# Patient Record
Sex: MALE | Race: WHITE
[De-identification: names, ages, dates, MRNs, and addresses within clinical notes are randomized per-mention and may not be internally consistent; named-entity substitution may affect disease eponyms.]

---

## 2017-06-19 ENCOUNTER — HOSPITAL ENCOUNTER (OUTPATIENT)
Dept: HOSPITAL 83 - CT | Age: 57
Discharge: HOME | End: 2017-06-19
Attending: INTERNAL MEDICINE
Payer: COMMERCIAL

## 2017-06-19 DIAGNOSIS — R91.8: Primary | ICD-10-CM

## 2017-06-19 DIAGNOSIS — I25.10: ICD-10-CM

## 2017-08-14 ENCOUNTER — HOSPITAL ENCOUNTER (OUTPATIENT)
Dept: HOSPITAL 83 - RAD | Age: 57
Discharge: HOME | End: 2017-08-14
Attending: UROLOGY
Payer: COMMERCIAL

## 2017-08-14 DIAGNOSIS — I70.0: ICD-10-CM

## 2017-08-14 DIAGNOSIS — N20.0: Primary | ICD-10-CM

## 2017-12-03 PROBLEM — R07.9 CHEST PAIN: Status: ACTIVE | Noted: 2017-12-03

## 2017-12-20 PROBLEM — I25.10 CAD IN NATIVE ARTERY: Status: ACTIVE | Noted: 2017-12-20

## 2018-03-13 ENCOUNTER — OFFICE VISIT (OUTPATIENT)
Dept: CARDIOLOGY CLINIC | Age: 58
End: 2018-03-13
Payer: COMMERCIAL

## 2018-03-13 VITALS
DIASTOLIC BLOOD PRESSURE: 80 MMHG | HEIGHT: 67 IN | BODY MASS INDEX: 34.67 KG/M2 | SYSTOLIC BLOOD PRESSURE: 120 MMHG | WEIGHT: 220.9 LBS | HEART RATE: 50 BPM | RESPIRATION RATE: 16 BRPM

## 2018-03-13 DIAGNOSIS — I25.10 CAD IN NATIVE ARTERY: ICD-10-CM

## 2018-03-13 DIAGNOSIS — I25.10 CORONARY ARTERY DISEASE INVOLVING NATIVE CORONARY ARTERY OF NATIVE HEART WITHOUT ANGINA PECTORIS: Primary | ICD-10-CM

## 2018-03-13 PROBLEM — R07.9 CHEST PAIN: Status: RESOLVED | Noted: 2017-12-03 | Resolved: 2018-03-13

## 2018-03-13 PROCEDURE — 93000 ELECTROCARDIOGRAM COMPLETE: CPT | Performed by: INTERNAL MEDICINE

## 2018-03-13 PROCEDURE — 99213 OFFICE O/P EST LOW 20 MIN: CPT | Performed by: INTERNAL MEDICINE

## 2018-03-13 RX ORDER — ROSUVASTATIN CALCIUM 40 MG/1
40 TABLET, COATED ORAL NIGHTLY
COMMUNITY
Start: 2018-02-20

## 2018-03-13 RX ORDER — RANITIDINE 150 MG/1
CAPSULE ORAL
COMMUNITY
Start: 2018-02-22 | End: 2019-09-10

## 2018-06-26 ENCOUNTER — HOSPITAL ENCOUNTER (EMERGENCY)
Dept: HOSPITAL 83 - ED | Age: 58
Discharge: HOME | End: 2018-06-26
Payer: COMMERCIAL

## 2018-06-26 VITALS — HEIGHT: 66.97 IN | WEIGHT: 210 LBS | BODY MASS INDEX: 32.96 KG/M2

## 2018-06-26 DIAGNOSIS — Z79.899: ICD-10-CM

## 2018-06-26 DIAGNOSIS — S62.663B: Primary | ICD-10-CM
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
## 2018-06-26 DIAGNOSIS — S66.902A: ICD-10-CM
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
## 2018-06-26 DIAGNOSIS — Z79.82: ICD-10-CM

## 2019-03-01 ENCOUNTER — HOSPITAL ENCOUNTER (OUTPATIENT)
Dept: HOSPITAL 83 - RAD | Age: 59
Discharge: HOME | End: 2019-03-01
Attending: NURSE PRACTITIONER
Payer: COMMERCIAL

## 2019-03-01 DIAGNOSIS — K21.9: ICD-10-CM

## 2019-03-01 DIAGNOSIS — K57.10: Primary | ICD-10-CM

## 2019-03-08 ENCOUNTER — OFFICE VISIT (OUTPATIENT)
Dept: CARDIOLOGY CLINIC | Age: 59
End: 2019-03-08
Payer: COMMERCIAL

## 2019-03-08 VITALS
WEIGHT: 220.5 LBS | BODY MASS INDEX: 34.61 KG/M2 | SYSTOLIC BLOOD PRESSURE: 138 MMHG | HEART RATE: 47 BPM | DIASTOLIC BLOOD PRESSURE: 80 MMHG | HEIGHT: 67 IN | RESPIRATION RATE: 16 BRPM

## 2019-03-08 DIAGNOSIS — I25.10 CAD IN NATIVE ARTERY: Primary | ICD-10-CM

## 2019-03-08 PROCEDURE — 99213 OFFICE O/P EST LOW 20 MIN: CPT | Performed by: INTERNAL MEDICINE

## 2019-03-08 PROCEDURE — 93000 ELECTROCARDIOGRAM COMPLETE: CPT | Performed by: INTERNAL MEDICINE

## 2019-03-08 RX ORDER — FLUOXETINE HYDROCHLORIDE 40 MG/1
40 CAPSULE ORAL DAILY
COMMUNITY

## 2019-09-06 ENCOUNTER — TELEPHONE (OUTPATIENT)
Dept: ADMINISTRATIVE | Age: 59
End: 2019-09-06

## 2019-09-09 ENCOUNTER — TELEPHONE (OUTPATIENT)
Dept: CARDIOLOGY CLINIC | Age: 59
End: 2019-09-09

## 2019-09-09 ENCOUNTER — OFFICE VISIT (OUTPATIENT)
Dept: CARDIOLOGY CLINIC | Age: 59
End: 2019-09-09
Payer: OTHER GOVERNMENT

## 2019-09-09 VITALS
HEIGHT: 68 IN | HEART RATE: 52 BPM | SYSTOLIC BLOOD PRESSURE: 120 MMHG | DIASTOLIC BLOOD PRESSURE: 62 MMHG | WEIGHT: 217.4 LBS | BODY MASS INDEX: 32.95 KG/M2 | RESPIRATION RATE: 16 BRPM

## 2019-09-09 DIAGNOSIS — R55 NEAR SYNCOPE: ICD-10-CM

## 2019-09-09 DIAGNOSIS — I25.10 CAD IN NATIVE ARTERY: Primary | ICD-10-CM

## 2019-09-09 DIAGNOSIS — R00.1 SINUS BRADYCARDIA: ICD-10-CM

## 2019-09-09 DIAGNOSIS — R07.89 OTHER CHEST PAIN: ICD-10-CM

## 2019-09-09 DIAGNOSIS — R06.02 SOB (SHORTNESS OF BREATH): ICD-10-CM

## 2019-09-09 PROCEDURE — 93000 ELECTROCARDIOGRAM COMPLETE: CPT | Performed by: INTERNAL MEDICINE

## 2019-09-09 PROCEDURE — 99214 OFFICE O/P EST MOD 30 MIN: CPT | Performed by: INTERNAL MEDICINE

## 2019-09-09 RX ORDER — FLUDROCORTISONE ACETATE 0.1 MG/1
0.1 TABLET ORAL DAILY
Qty: 60 TABLET | Refills: 3 | Status: ON HOLD | OUTPATIENT
Start: 2019-09-09 | End: 2019-09-11 | Stop reason: HOSPADM

## 2019-09-09 NOTE — PROGRESS NOTES
Ortho BP's per Dr Sandip Modi    BP Lyin/74  P lyin  BP sittin/78  P sittin  BP standin/78  P Standin

## 2019-09-09 NOTE — PROGRESS NOTES
SL tablet up to max of 3 total doses. If no relief after 1 dose, call 911. 25 tablet 3    hydrOXYzine (VISTARIL) 25 MG capsule Take 25 mg by mouth daily      Esomeprazole Magnesium 20 MG TBEC Take 20 mg by mouth Indications: Nexium       Coenzyme Q10 (CO Q 10) 100 MG CAPS Take 100 mg by mouth nightly      aspirin 81 MG tablet Take 81 mg by mouth daily.  ranitidine (ZANTAC) 150 MG capsule       citalopram (CELEXA) 20 MG tablet TAKE 1 TABLET BY MOUTH AT BEDTIME  1    atorvastatin (LIPITOR) 40 MG tablet TAKE 1 TABLET BY MOUTH NIGHTLY. (Patient not taking: Reported on 9/9/2019) 90 tablet 3     No current facility-administered medications for this visit. No Known Allergies    Chief Complaint:  Isidore Dakins is here today for follow up and management/recomendations for CAD     History of Present Illness: Isidore Dakins is being seen today for initial hospital follow-up. Mr. Manas Rivera was in [facility]. He states that he walked 5 miles and then went back to the house and got a drink of water. He then became very sweaty, diaphoretic, had chest discomfort and became very short of breath. He was evaluated at the [facility] and a heart catheterization was performed which was negative. There is a 60% circumflex lesion and 40% left main lesion. The FFR was negative. He then had an echocardiogram.  This demonstrated normal LV systolic function but the right ventricle was dilated. He then states that [facility] where he had a CAT scan of his chest and was told that that was negative for a PE as well. I do not have the CAT scan results. After this he then states that he did try to walk 3 miles but again became very short of breath. He did fly to and from [facility]. He states that he continues to feel dyspnea with exertion. He also has had dizziness and lightheadedness for the past couple of months. He then states that his Flomax dose was doubled several weeks ago. States that He does house work, goes up the stairs,  goes shopping. He denies any orthopnea/PND, or lower extremity edema. REVIEW OF SYSTEMS:  As above. Patient does not complain of any fever, chills, nausea, vomiting or diarrhea. No focal, motor or neurological deficits. No changes in his/her vision, hearing, bowel or bladder habits. He is not known to have a history of thyroid problems. No recent nose bleeds. PHYSICAL EXAM:  Vitals:    09/09/19 1543   BP: 120/62   Pulse: 52   Resp: 16   Weight: 217 lb 6.4 oz (98.6 kg)   Height: 5' 8\" (1.727 m)       GENERAL:  He is alert and oriented x 3, communicates well, in no distress. NECK:  No masses, trachea is mid position. Supple, full ROM, no JVD or bruits. No palpable thyromegaly or lymphadenopathy. HEART:  Regular rate and rhythm. Normal S1 and S2. There is an S4 gallop and a I/VI systolic murmur. LUNGS:  Clear to auscultation bilaterally. No use of accessory muscles. symmetrical excursion. ABDOMEN:  Soft, non-tender. Normal bowel sounds. EXTREMITIES:  Full ROM x 4. No bilateral lower extremity edema. Good distal pulses. EYES:  Extraocular muscles intact. PERRL. Normal lids & conjunctiva. ENT:  Nares are clear & not bleeding. Moist mucosa. Normal lips formation. No external masses   NEURO: no tremors, full ROM x 4, EOMI. SKIN:  Warm, dry and intact. Normal turgor. EKG: Sinus, 52 bpm, nl axis, nonspecific ST - T wave changes. Assessment:   1. Coronary artery disease as outlined above. Clinically no symptoms of recurring ischemia at this time. 2. Hypercholesterolemia  3. sinus bradycardia. His heart rate was 43 at his PCPs office today. 4. Dyspnea on exertion. Possible chronotropic incompetence. 5. Near syncope. Possibly related to his bradycardia. However, he also states that his Flomax dose was doubled a few weeks ago. Reportedly the orthostatics were negative at his PCPs office today.   I will repeat them again

## 2019-09-09 NOTE — TELEPHONE ENCOUNTER
Spoke with Charlie Avitia at [facility] office. Patient was seen in their office today with complaints of bradycardia and dizziness. BP/P today 118/71 (43). Orthostatics were okay per Charlie Avitia. Please advise.

## 2019-09-10 ENCOUNTER — HOSPITAL ENCOUNTER (OUTPATIENT)
Dept: HOSPITAL 83 - LAB | Age: 59
Discharge: HOME | End: 2019-09-10
Attending: INTERNAL MEDICINE
Payer: COMMERCIAL

## 2019-09-10 ENCOUNTER — HOSPITAL ENCOUNTER (OUTPATIENT)
Dept: CT IMAGING | Age: 59
Discharge: HOME | End: 2019-09-12
Payer: OTHER GOVERNMENT

## 2019-09-10 ENCOUNTER — HOSPITAL ENCOUNTER (OUTPATIENT)
Age: 59
Setting detail: OBSERVATION
Discharge: HOME | End: 2019-09-11
Attending: EMERGENCY MEDICINE | Admitting: INTERNAL MEDICINE
Payer: OTHER GOVERNMENT

## 2019-09-10 ENCOUNTER — TELEPHONE (OUTPATIENT)
Dept: CARDIOLOGY CLINIC | Age: 59
End: 2019-09-10

## 2019-09-10 ENCOUNTER — APPOINTMENT (OUTPATIENT)
Dept: ULTRASOUND IMAGING | Age: 59
End: 2019-09-10
Payer: OTHER GOVERNMENT

## 2019-09-10 DIAGNOSIS — R06.02 SOB (SHORTNESS OF BREATH): ICD-10-CM

## 2019-09-10 DIAGNOSIS — R06.09 DYSPNEA ON EXERTION: ICD-10-CM

## 2019-09-10 DIAGNOSIS — I26.99 ACUTE PULMONARY EMBOLISM WITHOUT ACUTE COR PULMONALE, UNSPECIFIED PULMONARY EMBOLISM TYPE (HCC): Primary | ICD-10-CM

## 2019-09-10 DIAGNOSIS — R06.02: ICD-10-CM

## 2019-09-10 DIAGNOSIS — R79.89 ELEVATED D-DIMER: ICD-10-CM

## 2019-09-10 DIAGNOSIS — R07.89: Primary | ICD-10-CM

## 2019-09-10 DIAGNOSIS — R07.89 OTHER CHEST PAIN: ICD-10-CM

## 2019-09-10 DIAGNOSIS — R06.09 DYSPNEA ON EXERTION: Primary | ICD-10-CM

## 2019-09-10 PROBLEM — I25.10 CAD IN NATIVE ARTERY: Chronic | Status: ACTIVE | Noted: 2017-12-20

## 2019-09-10 LAB
ALBUMIN SERPL-MCNC: 4.3 G/DL (ref 3.5–5.2)
ALP BLD-CCNC: 59 U/L (ref 40–129)
ALT SERPL-CCNC: 22 U/L (ref 0–40)
ANION GAP SERPL CALCULATED.3IONS-SCNC: 9 MMOL/L (ref 7–16)
AST SERPL-CCNC: 20 U/L (ref 0–39)
BASOPHILS ABSOLUTE: 0.06 E9/L (ref 0–0.2)
BASOPHILS RELATIVE PERCENT: 1 % (ref 0–2)
BILIRUB SERPL-MCNC: 0.3 MG/DL (ref 0–1.2)
BUN BLDV-MCNC: 16 MG/DL (ref 6–20)
CALCIUM SERPL-MCNC: 9.3 MG/DL (ref 8.6–10.2)
CHLORIDE BLD-SCNC: 102 MMOL/L (ref 98–107)
CO2: 28 MMOL/L (ref 22–29)
CREAT SERPL-MCNC: 0.9 MG/DL (ref 0.7–1.2)
D-DIMER QUANTITATIVE: 0.53 MG/L FEU (ref 0–0.49)
EKG ATRIAL RATE: 40 BPM
EKG P AXIS: 22 DEGREES
EKG P-R INTERVAL: 184 MS
EKG Q-T INTERVAL: 478 MS
EKG QRS DURATION: 96 MS
EKG QTC CALCULATION (BAZETT): 389 MS
EKG R AXIS: -35 DEGREES
EKG T AXIS: 5 DEGREES
EKG VENTRICULAR RATE: 40 BPM
EOSINOPHILS ABSOLUTE: 0.39 E9/L (ref 0.05–0.5)
EOSINOPHILS RELATIVE PERCENT: 6.3 % (ref 0–6)
GFR AFRICAN AMERICAN: >60
GFR NON-AFRICAN AMERICAN: >60 ML/MIN/1.73
GLUCOSE BLD-MCNC: 91 MG/DL (ref 74–99)
HCT VFR BLD CALC: 40.7 % (ref 37–54)
HEMOGLOBIN: 13.4 G/DL (ref 12.5–16.5)
IMMATURE GRANULOCYTES #: 0.02 E9/L
IMMATURE GRANULOCYTES %: 0.3 % (ref 0–5)
LYMPHOCYTES ABSOLUTE: 1.8 E9/L (ref 1.5–4)
LYMPHOCYTES RELATIVE PERCENT: 28.9 % (ref 20–42)
MCH RBC QN AUTO: 28.5 PG (ref 26–35)
MCHC RBC AUTO-ENTMCNC: 32.9 % (ref 32–34.5)
MCV RBC AUTO: 86.4 FL (ref 80–99.9)
MONOCYTES ABSOLUTE: 0.75 E9/L (ref 0.1–0.95)
MONOCYTES RELATIVE PERCENT: 12 % (ref 2–12)
NEUTROPHILS ABSOLUTE: 3.21 E9/L (ref 1.8–7.3)
NEUTROPHILS RELATIVE PERCENT: 51.5 % (ref 43–80)
PDW BLD-RTO: 12.5 FL (ref 11.5–15)
PLATELET # BLD: 269 E9/L (ref 130–450)
PMV BLD AUTO: 9.5 FL (ref 7–12)
POTASSIUM SERPL-SCNC: 4.2 MMOL/L (ref 3.5–5)
PRO-BNP: 109 PG/ML (ref 0–125)
RBC # BLD: 4.71 E12/L (ref 3.8–5.8)
SODIUM BLD-SCNC: 139 MMOL/L (ref 132–146)
TOTAL PROTEIN: 7 G/DL (ref 6.4–8.3)
TROPONIN: <0.01 NG/ML (ref 0–0.03)
WBC # BLD: 6.2 E9/L (ref 4.5–11.5)

## 2019-09-10 PROCEDURE — 85025 COMPLETE CBC W/AUTO DIFF WBC: CPT

## 2019-09-10 PROCEDURE — 2580000003 HC RX 258: Performed by: INTERNAL MEDICINE

## 2019-09-10 PROCEDURE — 80053 COMPREHEN METABOLIC PANEL: CPT

## 2019-09-10 PROCEDURE — 2580000003 HC RX 258: Performed by: RADIOLOGY

## 2019-09-10 PROCEDURE — 93005 ELECTROCARDIOGRAM TRACING: CPT | Performed by: EMERGENCY MEDICINE

## 2019-09-10 PROCEDURE — 71275 CT ANGIOGRAPHY CHEST: CPT

## 2019-09-10 PROCEDURE — 84484 ASSAY OF TROPONIN QUANT: CPT

## 2019-09-10 PROCEDURE — 99219 PR INITIAL OBSERVATION CARE/DAY 50 MINUTES: CPT | Performed by: INTERNAL MEDICINE

## 2019-09-10 PROCEDURE — 6370000000 HC RX 637 (ALT 250 FOR IP): Performed by: INTERNAL MEDICINE

## 2019-09-10 PROCEDURE — 6360000004 HC RX CONTRAST MEDICATION: Performed by: RADIOLOGY

## 2019-09-10 PROCEDURE — 94760 N-INVAS EAR/PLS OXIMETRY 1: CPT

## 2019-09-10 PROCEDURE — G0378 HOSPITAL OBSERVATION PER HR: HCPCS

## 2019-09-10 PROCEDURE — 99285 EMERGENCY DEPT VISIT HI MDM: CPT

## 2019-09-10 PROCEDURE — 93010 ELECTROCARDIOGRAM REPORT: CPT | Performed by: INTERNAL MEDICINE

## 2019-09-10 PROCEDURE — 6360000002 HC RX W HCPCS: Performed by: EMERGENCY MEDICINE

## 2019-09-10 PROCEDURE — 83880 ASSAY OF NATRIURETIC PEPTIDE: CPT

## 2019-09-10 PROCEDURE — 93970 EXTREMITY STUDY: CPT

## 2019-09-10 PROCEDURE — 96372 THER/PROPH/DIAG INJ SC/IM: CPT

## 2019-09-10 RX ORDER — ACETAMINOPHEN 325 MG/1
650 TABLET ORAL EVERY 4 HOURS PRN
Status: DISCONTINUED | OUTPATIENT
Start: 2019-09-10 | End: 2019-09-11 | Stop reason: HOSPADM

## 2019-09-10 RX ORDER — ROSUVASTATIN CALCIUM 20 MG/1
40 TABLET, COATED ORAL NIGHTLY
Status: DISCONTINUED | OUTPATIENT
Start: 2019-09-10 | End: 2019-09-11 | Stop reason: HOSPADM

## 2019-09-10 RX ORDER — SODIUM CHLORIDE 0.9 % (FLUSH) 0.9 %
10 SYRINGE (ML) INJECTION PRN
Status: COMPLETED | OUTPATIENT
Start: 2019-09-10 | End: 2019-09-10

## 2019-09-10 RX ORDER — MAGNESIUM SULFATE 1 G/100ML
1 INJECTION INTRAVENOUS PRN
Status: DISCONTINUED | OUTPATIENT
Start: 2019-09-10 | End: 2019-09-11 | Stop reason: HOSPADM

## 2019-09-10 RX ORDER — SODIUM CHLORIDE 0.9 % (FLUSH) 0.9 %
10 SYRINGE (ML) INJECTION EVERY 12 HOURS SCHEDULED
Status: DISCONTINUED | OUTPATIENT
Start: 2019-09-10 | End: 2019-09-11 | Stop reason: HOSPADM

## 2019-09-10 RX ORDER — HYDROXYZINE PAMOATE 25 MG/1
25 CAPSULE ORAL DAILY
Status: DISCONTINUED | OUTPATIENT
Start: 2019-09-11 | End: 2019-09-11 | Stop reason: HOSPADM

## 2019-09-10 RX ORDER — PANTOPRAZOLE SODIUM 40 MG/1
40 TABLET, DELAYED RELEASE ORAL
Status: DISCONTINUED | OUTPATIENT
Start: 2019-09-11 | End: 2019-09-11 | Stop reason: HOSPADM

## 2019-09-10 RX ORDER — POTASSIUM CHLORIDE 7.45 MG/ML
10 INJECTION INTRAVENOUS PRN
Status: DISCONTINUED | OUTPATIENT
Start: 2019-09-10 | End: 2019-09-11 | Stop reason: HOSPADM

## 2019-09-10 RX ORDER — SODIUM CHLORIDE 0.9 % (FLUSH) 0.9 %
10 SYRINGE (ML) INJECTION PRN
Status: DISCONTINUED | OUTPATIENT
Start: 2019-09-10 | End: 2019-09-11 | Stop reason: HOSPADM

## 2019-09-10 RX ORDER — CLOPIDOGREL BISULFATE 75 MG/1
75 TABLET ORAL DAILY
Status: DISCONTINUED | OUTPATIENT
Start: 2019-09-11 | End: 2019-09-11 | Stop reason: HOSPADM

## 2019-09-10 RX ORDER — ONDANSETRON 2 MG/ML
4 INJECTION INTRAMUSCULAR; INTRAVENOUS EVERY 6 HOURS PRN
Status: DISCONTINUED | OUTPATIENT
Start: 2019-09-10 | End: 2019-09-11 | Stop reason: HOSPADM

## 2019-09-10 RX ORDER — POTASSIUM CHLORIDE 20 MEQ/1
40 TABLET, EXTENDED RELEASE ORAL PRN
Status: DISCONTINUED | OUTPATIENT
Start: 2019-09-10 | End: 2019-09-11 | Stop reason: HOSPADM

## 2019-09-10 RX ORDER — TAMSULOSIN HYDROCHLORIDE 0.4 MG/1
0.4 CAPSULE ORAL DAILY
COMMUNITY
End: 2019-09-10 | Stop reason: ALTCHOICE

## 2019-09-10 RX ORDER — FLUOXETINE HYDROCHLORIDE 20 MG/1
40 CAPSULE ORAL DAILY
Status: DISCONTINUED | OUTPATIENT
Start: 2019-09-11 | End: 2019-09-11 | Stop reason: HOSPADM

## 2019-09-10 RX ADMIN — ROSUVASTATIN CALCIUM 40 MG: 20 TABLET, FILM COATED ORAL at 22:33

## 2019-09-10 RX ADMIN — Medication 10 ML: at 22:33

## 2019-09-10 RX ADMIN — ENOXAPARIN SODIUM 100 MG: 100 INJECTION SUBCUTANEOUS at 18:22

## 2019-09-10 RX ADMIN — Medication 10 ML: at 16:14

## 2019-09-10 RX ADMIN — IOPAMIDOL 90 ML: 755 INJECTION, SOLUTION INTRAVENOUS at 16:14

## 2019-09-10 ASSESSMENT — PAIN SCALES - GENERAL: PAINLEVEL_OUTOF10: 0

## 2019-09-10 NOTE — ED PROVIDER NOTES
allergies.     -------------------------------------------------- RESULTS -------------------------------------------------  All laboratory and radiology results have been personally reviewed by myself   LABS:  Results for orders placed or performed during the hospital encounter of 09/10/19   CBC Auto Differential   Result Value Ref Range    WBC 6.2 4.5 - 11.5 E9/L    RBC 4.71 3.80 - 5.80 E12/L    Hemoglobin 13.4 12.5 - 16.5 g/dL    Hematocrit 40.7 37.0 - 54.0 %    MCV 86.4 80.0 - 99.9 fL    MCH 28.5 26.0 - 35.0 pg    MCHC 32.9 32.0 - 34.5 %    RDW 12.5 11.5 - 15.0 fL    Platelets 639 142 - 697 E9/L    MPV 9.5 7.0 - 12.0 fL    Neutrophils % 51.5 43.0 - 80.0 %    Immature Granulocytes % 0.3 0.0 - 5.0 %    Lymphocytes % 28.9 20.0 - 42.0 %    Monocytes % 12.0 2.0 - 12.0 %    Eosinophils % 6.3 (H) 0.0 - 6.0 %    Basophils % 1.0 0.0 - 2.0 %    Neutrophils Absolute 3.21 1.80 - 7.30 E9/L    Immature Granulocytes # 0.02 E9/L    Lymphocytes Absolute 1.80 1.50 - 4.00 E9/L    Monocytes Absolute 0.75 0.10 - 0.95 E9/L    Eosinophils Absolute 0.39 0.05 - 0.50 E9/L    Basophils Absolute 0.06 0.00 - 0.20 E9/L   Comprehensive Metabolic Panel   Result Value Ref Range    Sodium 139 132 - 146 mmol/L    Potassium 4.2 3.5 - 5.0 mmol/L    Chloride 102 98 - 107 mmol/L    CO2 28 22 - 29 mmol/L    Anion Gap 9 7 - 16 mmol/L    Glucose 91 74 - 99 mg/dL    BUN 16 6 - 20 mg/dL    CREATININE 0.9 0.7 - 1.2 mg/dL    GFR Non-African American >60 >=60 mL/min/1.73    GFR African American >60     Calcium 9.3 8.6 - 10.2 mg/dL    Total Protein 7.0 6.4 - 8.3 g/dL    Alb 4.3 3.5 - 5.2 g/dL    Total Bilirubin 0.3 0.0 - 1.2 mg/dL    Alkaline Phosphatase 59 40 - 129 U/L    ALT 22 0 - 40 U/L    AST 20 0 - 39 U/L   Troponin   Result Value Ref Range    Troponin <0.01 0.00 - 0.03 ng/mL   Brain Natriuretic Peptide   Result Value Ref Range    Pro- 0 - 125 pg/mL   EKG 12 Lead   Result Value Ref Range    Ventricular Rate 40 BPM    Atrial Rate 40 BPM    P-R Interval 184 ms    QRS Duration 96 ms    Q-T Interval 478 ms    QTc Calculation (Bazett) 389 ms    P Axis 22 degrees    R Axis -35 degrees    T Axis 5 degrees       RADIOLOGY:  Interpreted by Radiologist.  [address] [facility] DUP LOWER EXTREMITIES BILATERAL VENOUS   Final Result   No evidence to suggest deep venous thrombosis of the bilateral lower   extremities. ------------------------- NURSING NOTES AND VITALS REVIEWED ---------------------------   The nursing notes within the ED encounter and vital signs as below have been reviewed. BP (!) 170/79   Pulse (!) 43   Temp 97.9 °F (36.6 °C) (Oral)   Resp 18   Ht 5' 7\" (1.702 m)   Wt 216 lb (98 kg)   SpO2 98%   BMI 33.83 kg/m²   Oxygen Saturation Interpretation: Normal      ---------------------------------------------------PHYSICAL EXAM--------------------------------------      Constitutional/General: Alert and oriented x3, well appearing, non toxic in NAD  Head: Normocephalic and atraumatic  Eyes: PERRL, EOMI  Mouth: Oropharynx clear, handling secretions, no trismus  Neck: Supple, full ROM,   Pulmonary: Lungs clear to auscultation bilaterally, no wheezes, rales, or rhonchi. Not in respiratory distress  Cardiovascular:  Regular rate and rhythm, no murmurs, gallops, or rubs. 2+ distal pulses  Abdomen: Soft, non tender, non distended,   Extremities: Moves all extremities x 4. Warm and well perfused  Skin: warm and dry without rash  Neurologic: GCS 15,  Psych: Normal Affect      ------------------------------ ED COURSE/MEDICAL DECISION MAKING----------------------  Medications   enoxaparin (LOVENOX) injection 100 mg (100 mg Subcutaneous Given 9/10/19 1822)         ED COURSE:  ED Course as of Sep 10 2013   Tue Sep 10, 2019   1924 Spoke with Dr. James Medina.  He will follow in consultation.     [CS]      ED Course User Index  [CS] Valentina Knox DO       Medical Decision Makin-year-old male with prior cardiac history presenting with a diagnosed pulmonary embolism, subsegmental, left upper lobe, on CTA today. Patient has exertional dyspnea but is not an extremis. Other labs are within normal limits. Discussed case with pulmonology and they recommended initial dose of Lovenox and continue anticoagulation. This was discussed with the admitting team, who will bring the patient to monitored floor. Counseling: The emergency provider has spoken with the patient and discussed todays results, in addition to providing specific details for the plan of care and counseling regarding the diagnosis and prognosis. Questions are answered at this time and they are agreeable with the plan.      --------------------------------- IMPRESSION AND DISPOSITION ---------------------------------    IMPRESSION  1. Acute pulmonary embolism without acute cor pulmonale, unspecified pulmonary embolism type (HCC)        DISPOSITION  Disposition: Admit to telemetry  Patient condition is stable      NOTE: This report was transcribed using voice recognition software.  Every effort was made to ensure accuracy; however, inadvertent computerized transcription errors may be present       Karyle Broody, DO  Resident  09/10/19 2013

## 2019-09-10 NOTE — H&P
Niki Reed,    hydrOXYzine (VISTARIL) 25 MG capsule Take 25 mg by mouth daily   Yes Historical Provider, MD   Coenzyme Q10 (CO Q 10) 100 MG CAPS Take 100 mg by mouth nightly   Yes Historical Provider, MD   aspirin 81 MG tablet Take 81 mg by mouth nightly    Yes Historical Provider, MD   nitroGLYCERIN (NITROSTAT) 0.4 MG SL tablet up to max of 3 total doses. If no relief after 1 dose, call 911. 1/3/18   BARBARA Leon       Allergies:    Patient has no known allergies. Social History:    reports that he has never smoked. He has never used smokeless tobacco. He reports that he drinks alcohol. He reports that he does not use drugs. Family History:   family history includes Heart Disease in his father; High Blood Pressure in his mother; No Known Problems in his brother, sister, and sister.      PHYSICAL EXAM:  Vitals:  /78   Pulse (!) 38   Temp 98.4 °F (36.9 °C) (Oral)   Resp 18   Ht 5' 7\" (1.702 m)   Wt 215 lb (97.5 kg)   SpO2 96%   BMI 33.67 kg/m²   General Appearance: alert and oriented to person, place and time and in no acute distress  Skin: warm and dry  Head: normocephalic and atraumatic  Eyes: pupils equal, round, and reactive to light, extraocular eye movements intact, conjunctivae normal  Neck: neck supple and non tender without mass   Pulmonary/Chest: clear to auscultation bilaterally- no wheezes, rales or rhonchi, normal air movement, no respiratory distress  Cardiovascular: sinus bradycardia, normal S1 and S2 and no carotid bruits  Abdomen: soft, non-tender, non-distended, no rebound, no rigidity, normal bowel sounds, no masses or organomegaly  Extremities: no cyanosis, no clubbing and no edema  Neurologic: no cranial nerve deficit and speech normal    LABS:  CBC  Recent Labs     09/10/19  1808   WBC 6.2   HGB 13.4   HCT 40.7   MCV 86.4        BMP  Recent Labs     09/10/19  1808      K 4.2      CO2 28   BUN 16   CREATININE 0.9   LABGLOM >60   GLUCOSE 91

## 2019-09-11 VITALS
HEART RATE: 41 BPM | RESPIRATION RATE: 18 BRPM | HEIGHT: 67 IN | DIASTOLIC BLOOD PRESSURE: 81 MMHG | BODY MASS INDEX: 33.74 KG/M2 | WEIGHT: 215 LBS | SYSTOLIC BLOOD PRESSURE: 136 MMHG | TEMPERATURE: 97.9 F | OXYGEN SATURATION: 96 %

## 2019-09-11 LAB
ALBUMIN SERPL-MCNC: 4 G/DL (ref 3.5–5.2)
ALP BLD-CCNC: 52 U/L (ref 40–129)
ALT SERPL-CCNC: 20 U/L (ref 0–40)
ANION GAP SERPL CALCULATED.3IONS-SCNC: 11 MMOL/L (ref 7–16)
AST SERPL-CCNC: 19 U/L (ref 0–39)
BASOPHILS ABSOLUTE: 0.06 E9/L (ref 0–0.2)
BASOPHILS RELATIVE PERCENT: 1 % (ref 0–2)
BILIRUB SERPL-MCNC: 0.3 MG/DL (ref 0–1.2)
BILIRUBIN DIRECT: <0.2 MG/DL (ref 0–0.3)
BILIRUBIN, INDIRECT: NORMAL MG/DL (ref 0–1)
BUN BLDV-MCNC: 17 MG/DL (ref 6–20)
CALCIUM SERPL-MCNC: 8.9 MG/DL (ref 8.6–10.2)
CHLORIDE BLD-SCNC: 105 MMOL/L (ref 98–107)
CO2: 26 MMOL/L (ref 22–29)
CREAT SERPL-MCNC: 0.8 MG/DL (ref 0.7–1.2)
EOSINOPHILS ABSOLUTE: 0.44 E9/L (ref 0.05–0.5)
EOSINOPHILS RELATIVE PERCENT: 7.7 % (ref 0–6)
GFR AFRICAN AMERICAN: >60
GFR NON-AFRICAN AMERICAN: >60 ML/MIN/1.73
GLUCOSE BLD-MCNC: 119 MG/DL (ref 74–99)
HCT VFR BLD CALC: 38.5 % (ref 37–54)
HEMOGLOBIN: 13.1 G/DL (ref 12.5–16.5)
IMMATURE GRANULOCYTES #: 0.03 E9/L
IMMATURE GRANULOCYTES %: 0.5 % (ref 0–5)
LYMPHOCYTES ABSOLUTE: 2.01 E9/L (ref 1.5–4)
LYMPHOCYTES RELATIVE PERCENT: 35 % (ref 20–42)
MAGNESIUM: 2.1 MG/DL (ref 1.6–2.6)
MCH RBC QN AUTO: 29.4 PG (ref 26–35)
MCHC RBC AUTO-ENTMCNC: 34 % (ref 32–34.5)
MCV RBC AUTO: 86.3 FL (ref 80–99.9)
MONOCYTES ABSOLUTE: 0.69 E9/L (ref 0.1–0.95)
MONOCYTES RELATIVE PERCENT: 12 % (ref 2–12)
NEUTROPHILS ABSOLUTE: 2.51 E9/L (ref 1.8–7.3)
NEUTROPHILS RELATIVE PERCENT: 43.8 % (ref 43–80)
PDW BLD-RTO: 12.5 FL (ref 11.5–15)
PLATELET # BLD: 243 E9/L (ref 130–450)
PMV BLD AUTO: 9.5 FL (ref 7–12)
POTASSIUM REFLEX MAGNESIUM: 3.8 MMOL/L (ref 3.5–5)
RBC # BLD: 4.46 E12/L (ref 3.8–5.8)
SODIUM BLD-SCNC: 142 MMOL/L (ref 132–146)
TOTAL PROTEIN: 6.4 G/DL (ref 6.4–8.3)
WBC # BLD: 5.7 E9/L (ref 4.5–11.5)

## 2019-09-11 PROCEDURE — G0378 HOSPITAL OBSERVATION PER HR: HCPCS

## 2019-09-11 PROCEDURE — 99217 PR OBSERVATION CARE DISCHARGE MANAGEMENT: CPT | Performed by: INTERNAL MEDICINE

## 2019-09-11 PROCEDURE — 2580000003 HC RX 258: Performed by: INTERNAL MEDICINE

## 2019-09-11 PROCEDURE — 80048 BASIC METABOLIC PNL TOTAL CA: CPT

## 2019-09-11 PROCEDURE — APPSS45 APP SPLIT SHARED TIME 31-45 MINUTES: Performed by: PHYSICIAN ASSISTANT

## 2019-09-11 PROCEDURE — 83735 ASSAY OF MAGNESIUM: CPT

## 2019-09-11 PROCEDURE — 99243 OFF/OP CNSLTJ NEW/EST LOW 30: CPT | Performed by: INTERNAL MEDICINE

## 2019-09-11 PROCEDURE — 6370000000 HC RX 637 (ALT 250 FOR IP): Performed by: INTERNAL MEDICINE

## 2019-09-11 PROCEDURE — 85025 COMPLETE CBC W/AUTO DIFF WBC: CPT

## 2019-09-11 PROCEDURE — 80076 HEPATIC FUNCTION PANEL: CPT

## 2019-09-11 PROCEDURE — 36415 COLL VENOUS BLD VENIPUNCTURE: CPT

## 2019-09-11 RX ADMIN — ACETAMINOPHEN 650 MG: 325 TABLET ORAL at 12:14

## 2019-09-11 RX ADMIN — APIXABAN 10 MG: 5 TABLET, FILM COATED ORAL at 09:14

## 2019-09-11 RX ADMIN — PANTOPRAZOLE SODIUM 40 MG: 40 TABLET, DELAYED RELEASE ORAL at 06:40

## 2019-09-11 RX ADMIN — FLUOXETINE 40 MG: 20 CAPSULE ORAL at 09:14

## 2019-09-11 RX ADMIN — Medication 10 ML: at 09:14

## 2019-09-11 RX ADMIN — HYDROXYZINE PAMOATE 25 MG: 25 CAPSULE ORAL at 09:14

## 2019-09-11 ASSESSMENT — PAIN SCALES - GENERAL
PAINLEVEL_OUTOF10: 0
PAINLEVEL_OUTOF10: 1
PAINLEVEL_OUTOF10: 0

## 2019-09-11 ASSESSMENT — PAIN DESCRIPTION - DESCRIPTORS: DESCRIPTORS: ACHING

## 2019-09-11 ASSESSMENT — PAIN DESCRIPTION - LOCATION: LOCATION: HEAD

## 2019-09-11 ASSESSMENT — PAIN DESCRIPTION - PROGRESSION: CLINICAL_PROGRESSION: NOT CHANGED

## 2019-09-11 ASSESSMENT — PAIN - FUNCTIONAL ASSESSMENT: PAIN_FUNCTIONAL_ASSESSMENT: ACTIVITIES ARE NOT PREVENTED

## 2019-09-11 ASSESSMENT — PAIN DESCRIPTION - ONSET: ONSET: GRADUAL

## 2019-09-11 ASSESSMENT — PAIN DESCRIPTION - PAIN TYPE: TYPE: ACUTE PAIN

## 2019-09-11 NOTE — DISCHARGE SUMMARY
as:   PLAVIX     fludrocortisone 0.1 MG tablet  Commonly known as:  FLORINEF     tamsulosin 0.4 MG capsule  Commonly known as:  FLOMAX           Where to Get Your Medications      These medications were sent to [facility]/pharmacy #[id number]- [place], [facility] [phone] -  [phone]  [address] [address]    Phone:  [phone]   · apixaban 5 MG Tabs tablet  · apixaban 5 MG Tabs tablet           Note that more than 30 minutes was spent in preparing discharge papers, discussing discharge with patient, medication review, etc.    Signed:  Electronically signed by Edy Connors on 9/11/2019 at 1:24 PM

## 2019-09-11 NOTE — CONSULTS
[facility] Cardiology Consultation    Freda Muñoz is a 63 y.o. male patient of Dr. Verna Mathur with a history of chronic ischemic heart disease who was admitted via the emergency room 09/10/2019 with a chief complaint of dyspnea He also had episodic rest \"cold sweats\". [place] symptoms. He had a coronary arteriogram which is reported to show a 60% circumflex stenosis and 40% left main stenosis. FFR was negative. An echo showed normal EF but dilated RV. Subsequently a CT scan was apparently negative for PE. (or inconclusive)  Upon returning to [place] a repeat CT scan was done. D-dimer 0.53 Scan  showed a small partially occluding defect of the distal subsegmental vessels of the left upper lobe. The patient has chronic ischemic heart disease. He had PCI with a EMBER 05/12/2014. RCA had 50 and 70% lesions which were significant by FFR. A Lexiscan stress was normal in 08/2016. He presented with chest pain 12/04/2017. A treadmill EKG was positive for ischemia at 10 METS. Subsequent angiogram showed a 95% LAD stenosis. An LAD stent was deployed (3.5 EMBER). He is chronically dizzy without syncope. recently Flomax stopped    He is physically active  He is a   Nonsmoker         Medical History:    1.  GERD  2.  Coronary artery disease:              A.  Cardiac catheterization May 12, 2014, showed an EF of 60% and the left main had a 20% lesion and the LAD had a 30% stenosis and the circumflex had some luminal irregularities and the RCA had a distal 50% to 70% lesion.  By FFR the lesion was significant and he underwent drug-eluting stent.  His last stress test was in October of 2015, it was nonischemic with an EF of 59%.               Tatyana Jain MPS: 8/23/2016: No evidence of stress induced ischemia, small fixed perfusion defecr within the mid and distal segments of the anteroseptal wall, suggesting possible prior infarction, mild hypokinesis in the distal segment of the anterior wall, EF 67%. 3.  Echocardiogram, October of 2015 with an EF of 55% and no significant valvulopathy  4.  Sleep apnea, compliant   5.  Prostate cancer in October of 2015 and he had radioactive seeds implanted at that time  6. HLD, on statin therapy   7. Anxiety/Depression   8 presentation with atypical chest pain 12/04/2017. Treadmill EKG positive for ischemia. 9 angiogram/PCI 12/05/2017. LM 0%. LAD 95%. Circumflex 0%. Dominant RCA 70%. LAD PCI   performed with 3.5 EMBER. ASA and Plavix maintain  10 echo 12/05/2017: No chamber dilatation or significant valve abnormality. 11 OP follow-up 01/03/2018: Asymptomatic. Beta-blocker not prescribed due to chronic bradycardia          Family History   Problem Relation Age of Onset    High Blood Pressure Mother     Heart Disease Father     No Known Problems Sister     No Known Problems Brother     No Known Problems Sister        Prior to Admission medications    Medication Sig Start Date End Date Taking?  Authorizing Provider   esomeprazole (NEXIUM) 20 MG delayed release capsule TAKE 1 CAPSULE BY MOUTH EVERY DAY 8/15/19  Yes Historical Provider, MD   fludrocortisone (FLORINEF) 0.1 MG tablet Take 1 tablet by mouth daily 9/9/19 10/9/19 Yes Reatha Mail, DO   FLUoxetine (PROZAC) 40 MG capsule Take 40 mg by mouth daily    Yes Historical Provider, MD   rosuvastatin (CRESTOR) 40 MG tablet Take 40 mg by mouth nightly  2/20/18  Yes Historical Provider, MD   clopidogrel (PLAVIX) 75 MG tablet Take 1 tablet by mouth daily  Patient taking differently: Take 75 mg by mouth nightly  1/19/18  Yes Reatha Mail, DO   hydrOXYzine (VISTARIL) 25 MG capsule Take 25 mg by mouth daily   Yes Historical Provider, MD   Coenzyme Q10 (CO Q 10) 100 MG CAPS Take 100 mg by mouth nightly   Yes Historical Provider, MD   aspirin 81 MG tablet Take 81 mg by mouth nightly    Yes Historical Provider, MD   nitroGLYCERIN (NITROSTAT) 0.4 MG SL tablet up to max of 3 total

## 2019-09-11 NOTE — CONSULTS
[address] NOTE    Patient: Vito Díaz  MRN: [id number]  : 1960    Encounter Date: 2019  Encounter Time: 1:44 PM     Date of Admission: .9/10/2019  5:37 PM    Consulting Physician:  Primary Care Physician:      ANSELMO Liu - SHANNON     [phone]    PROBLEM LIST:  Patient Active Problem List   Diagnosis    Gastroesophageal reflux disease    Prostate cancer ([facility] [address]    Mixed hyperlipidemia    Sinus bradycardia    CAD in native artery    Pulmonary embolism on left [facility])     Reason for Consultation: Pulmonary Embolism     HPI:   Mr. Lorena Lawrence is a 64 y/o male with past medical history noted that presented to [facility] with chest pain and shortness of breath found to have left sub-massive pulmonary embolism. Per patient dyspnea was moderate. He was in [place] when he became sweaty and diaphoretic. He went to [facility] where [facility] was completed showing 60% circumflex and 40% left main occlusion   Echocardiogram noted RV dilation and normal LV systolic fucntion. [facility] patient had negative CTA for PE. Patient on eliquis at this time and on room air. PAST MEDICAL HISTORY:   Past Medical History:   Diagnosis Date    CAD (coronary artery disease)     17 alpine 3.0x28 loraine mid-distal rca    Cancer [facility])     prostate    Gastroesophageal reflux disease     Heart problem     has a blockage    Hyperlipidemia     Prostate cancer ([facility] [address] 10/26/2015       PAST SURGICAL HISTORY:   Past Surgical History:   Procedure Laterality Date    CARDIAC SURGERY      Stent-2013    CORONARY ANGIOPLASTY WITH STENT PLACEMENT  2017    Dr. Сергей Heath - 3.5x 23 Xience Alpine LORAINE to the Prox LAD.     PROSTATE SURGERY      radioactive seeds implanted       FAMILY HISTORY:   Family History   Problem Relation Age of Onset    High Blood Pressure Mother     Heart Disease Father     No Known Problems Sister 3.8    105   CO2 28 26   BUN 16 17   CREATININE 0.9 0.8       MG:   Lab Results   Component Value Date    MG 2.1 09/11/2019     Ca/Phos:   Lab Results   Component Value Date    CALCIUM 8.9 09/11/2019     Amylase: No results found for: AMYLASE  Lipase: No results found for: LIPASE  LIVER PROFILE:   Recent Labs     09/10/19  1808 09/11/19  0315   AST 20 19   ALT 22 20   BILIDIR  --  <0.2   BILITOT 0.3 0.3   ALKPHOS 59 52       PT/INR: No results for input(s): PROTIME, INR in the last 72 hours. APTT: No results for input(s): APTT in the last 72 hours. Cardiac Enzymes:  Lab Results   Component Value Date    CKTOTAL 187 10/27/2015    CKMB 2.1 10/27/2015    TROPONINI <0.01 09/10/2019       Hgb A1C: No results found for: LABA1C  No results found for: EAG  LORELEI: No results found for: LORELEI  ESR: No results found for: SEDRATE  CRP: No results found for: CRP  D Dimer:   Lab Results   Component Value Date    DDIMER 0.53 (H) 09/10/2019     Folate and B12: No results found for: [id number], No results found for: FOLATE    Lactic Acid: No results found for: LACTA  Ammonia:   Cortisol:  Thyroid Studies:  Lab Results   Component Value Date    TSH 1.920 12/03/2017     CTA Chest 9/10/2019:  Small partially occluding filling defect in the distal subsegmental   vessels in the left upper lobe concerning for small subsegmental   pulmonary embolism. There is no central pulmonary embolism.       Mild COPD with  nonspecific 4 to 5 mm pulmonary nodules. Consider   surveillance according to Fleischner Society guidelines. Doppler US Legs 9/10/2019:  No evidence to suggest deep venous thrombosis of the bilateral lower   extremities.      ASSESSMENT:  1.) Left Submassive Pulmonary Embolism, Provoked  2.) Lung Nodules, Multiple - 4 to 5 mm  3.) COPD per CT Criteria     PLAN:  1.) eliquis 10 mg PO BID x 7 days then 5 mg PO BID x 6 months  2.) off O2  3.) anticipate D/C soon    Thank you Veronica Dewey MD very much for allowing me to see this patient in consultation and follow up. Care reviewed with nursing staff, medical and surgical specialty care, primary care and the patient's family as available. Restraints are ordered when the patient can do harm to him/herself by pulling out devices.     Marissa Grajeda M.D.

## 2019-09-11 NOTE — CARE COORDINATION
Pt started on eliquis; given 30 day free card and $10 co-pay card with instruction. Advised pt to follow up with physicians  As instructed at discharge to assure continuance of this medication, pt understands and agrees. Will follow. Carter Gardner.

## 2019-09-16 ENCOUNTER — TELEPHONE (OUTPATIENT)
Dept: NON INVASIVE DIAGNOSTICS | Age: 59
End: 2019-09-16

## 2019-09-20 NOTE — PROGRESS NOTES
Pro[facility] ([facility] 10/26/2015    Gastroesophageal reflux disease        Past Medical History:   Diagnosis Date    CAD (coronary artery disease)     12/20/17 alpine 3.0x28 loraine mid-distal rca    Cancer [facility])     prostate    Gastroesophageal reflux disease     Heart problem     has a blockage    Hyperlipidemia     Prostate cancer ([facility] 10/26/2015       Family History   Problem Relation Age of Onset    High Blood Pressure Mother     Heart Disease Father     No Known Problems Sister     No Known Problems Brother     No Known Problems Sister      There is no family history of sudden cardiac arrest    Social History     Tobacco Use    Smoking status: Never Smoker    Smokeless tobacco: Never Used   Substance Use Topics    Alcohol use: Yes     Comment: Occassionally        Current Outpatient Medications   Medication Sig Dispense Refill    apixaban (ELIQUIS) 5 MG TABS tablet Take 1 tablet by mouth 2 times daily 60 tablet 0    esomeprazole (NEXIUM) 20 MG delayed release capsule TAKE 1 CAPSULE BY MOUTH EVERY DAY  0    FLUoxetine (PROZAC) 40 MG capsule Take 40 mg by mouth daily       rosuvastatin (CRESTOR) 40 MG tablet Take 40 mg by mouth nightly       nitroGLYCERIN (NITROSTAT) 0.4 MG SL tablet up to max of 3 total doses. If no relief after 1 dose, call 911. 25 tablet 3    hydrOXYzine (VISTARIL) 25 MG capsule Take 25 mg by mouth daily      Coenzyme Q10 (CO Q 10) 100 MG CAPS Take 100 mg by mouth nightly      aspirin 81 MG tablet Take 81 mg by mouth nightly        No current facility-administered medications for this visit. No Known Allergies    ROS:   Constitutional: + Fatigue. HENT: Negative for epistaxis. Eyes: Negative for diploplia, blurred vision. Respiratory: Negative for cough, chest tightness, shortness of breath and wheezing. Cardiovascular: pertinent positives in HPI  Gastrointestinal: Negative for abdominal pain and blood in stool.    Genitourinary: Negative for hematuria and Assessment:     1. Dizziness/Near syncope  - most likely related to [place] 2/2 Flomax  - Florinef 0.1 mg QD--started by Dr. Paxton Carranza and was discontinued due to HTN  - Flomax discontinued and reported improvement in dizziness    2. Pulmonary Embolism ?  - discovered 9/10/19  - peripheral PE not causing any hemodynamic instability, RV /LV ratio almost 1, had echocardiography in [place] reported dilated right ventricle  - currently on Eliquis     3. CAD  - 5/12/14: PCI w/ a EMBER  - 12/5/17: 3.5 x 23 EMBER LAD   - 12/20/17: 3.0x28 EMBER RCA    4. Hyperlipidemia  - on statin therapy  Lab Results   Component Value Date    CHOL 138 10/27/2015     Lab Results   Component Value Date    TRIG 70 10/27/2015     Lab Results   Component Value Date    HDL 47 12/04/2017    HDL 59 10/27/2015     Lab Results   Component Value Date    LDLCALC 94 12/04/2017    LDLCALC 65 10/27/2015     Lab Results   Component Value Date    LABVLDL 13 12/04/2017    LABVLDL 14 10/27/2015     5. H/O prostate CA  No results found for: PSA, PSADIA    6. GERD    7. Sinus bradycardia  - post-MI  - dating back as far as 2014  - no change in ECG through the years  - no indication for pacemaker insertion at this time    Recommendations:    1. No changes in medications today. 2. Patient to wear a 48 hour HM to assess for chronotropic incompetence and correlate rhythm with symptoms. 3. Will call the patient with HM results and will determine follow up at that time. 4. Recommend Pulmonary follow-up with Dr. Prabhu Ghotra to discuss need for Eliquis. He will obtain previous CT scans of his chest from his formal Pulmonologist, Dr. Melani Nowak, in [place], [place]. I have spent a total of 80 minutes with the patient reviewing the above stated recommendations. A total of >50% of that time involved face-to-face time providing counseling and or coordination of care with the other providers. Thank you for allowing me to participate in your patient's care.   Please call me if there are any questions. Maximo Garrido DO  [facility] Cardiac Electrophysiology  [address] [facility]    NOTE: This report was transcribed using voice recognition software. Every effort was made to ensure accuracy; however, inadvertent computerized transcription errors may be present.

## 2019-09-25 ENCOUNTER — OFFICE VISIT (OUTPATIENT)
Dept: NON INVASIVE DIAGNOSTICS | Age: 59
End: 2019-09-25
Payer: OTHER GOVERNMENT

## 2019-09-25 VITALS
SYSTOLIC BLOOD PRESSURE: 128 MMHG | WEIGHT: 219 LBS | RESPIRATION RATE: 18 BRPM | BODY MASS INDEX: 34.37 KG/M2 | DIASTOLIC BLOOD PRESSURE: 80 MMHG | HEIGHT: 67 IN | HEART RATE: 49 BPM

## 2019-09-25 DIAGNOSIS — R55 NEAR SYNCOPE: ICD-10-CM

## 2019-09-25 DIAGNOSIS — R00.1 SINUS BRADYCARDIA: Primary | Chronic | ICD-10-CM

## 2019-09-25 PROCEDURE — 99245 OFF/OP CONSLTJ NEW/EST HI 55: CPT | Performed by: INTERNAL MEDICINE

## 2019-09-25 PROCEDURE — 93000 ELECTROCARDIOGRAM COMPLETE: CPT | Performed by: INTERNAL MEDICINE

## 2019-09-25 NOTE — LETTER
[facility] Electrophysiology  [address] [address]  Phone: [phone]  Fax: [phone] Helenassadohumberto Parker DO        September 25, 2019     Patient: Jena Cloud   YOB: 1960   Date of Visit: 9/25/2019       To Whom It May Concern: It is my medical opinion that Jena Cloud may return to full duty immediately with no restrictions. If you have any questions or concerns, please don't hesitate to call.     Sincerely,        Guy Stephens DO

## 2019-10-03 DIAGNOSIS — R00.1 SINUS BRADYCARDIA: Chronic | ICD-10-CM

## 2019-10-04 ENCOUNTER — TELEPHONE (OUTPATIENT)
Dept: NON INVASIVE DIAGNOSTICS | Age: 59
End: 2019-10-04

## 2019-12-12 ENCOUNTER — OFFICE VISIT (OUTPATIENT)
Dept: CARDIOLOGY CLINIC | Age: 59
End: 2019-12-12
Payer: OTHER GOVERNMENT

## 2019-12-12 VITALS
DIASTOLIC BLOOD PRESSURE: 80 MMHG | WEIGHT: 217.6 LBS | HEIGHT: 67 IN | SYSTOLIC BLOOD PRESSURE: 114 MMHG | HEART RATE: 54 BPM | RESPIRATION RATE: 18 BRPM | BODY MASS INDEX: 34.15 KG/M2

## 2019-12-12 DIAGNOSIS — I25.10 CAD IN NATIVE ARTERY: Primary | Chronic | ICD-10-CM

## 2019-12-12 PROCEDURE — 93000 ELECTROCARDIOGRAM COMPLETE: CPT | Performed by: INTERNAL MEDICINE

## 2019-12-12 PROCEDURE — 99213 OFFICE O/P EST LOW 20 MIN: CPT | Performed by: INTERNAL MEDICINE

## 2020-07-15 ENCOUNTER — HOSPITAL ENCOUNTER (OUTPATIENT)
Dept: HOSPITAL 83 - CT | Age: 60
Discharge: HOME | End: 2020-07-15
Attending: NURSE PRACTITIONER
Payer: COMMERCIAL

## 2020-07-15 DIAGNOSIS — N20.0: ICD-10-CM

## 2020-07-15 DIAGNOSIS — N28.89: Primary | ICD-10-CM

## 2021-05-20 ENCOUNTER — APPOINTMENT (OUTPATIENT)
Dept: NUCLEAR MEDICINE | Age: 61
DRG: 247 | End: 2021-05-20
Payer: OTHER GOVERNMENT

## 2021-05-20 ENCOUNTER — HOSPITAL ENCOUNTER (INPATIENT)
Age: 61
LOS: 3 days | Discharge: HOME | DRG: 247 | End: 2021-05-25
Attending: EMERGENCY MEDICINE | Admitting: INTERNAL MEDICINE
Payer: OTHER GOVERNMENT

## 2021-05-20 ENCOUNTER — APPOINTMENT (OUTPATIENT)
Dept: GENERAL RADIOLOGY | Age: 61
DRG: 247 | End: 2021-05-20
Payer: OTHER GOVERNMENT

## 2021-05-20 DIAGNOSIS — R07.9 CHEST PAIN, UNSPECIFIED TYPE: Primary | ICD-10-CM

## 2021-05-20 DIAGNOSIS — R00.1 BRADYCARDIA: ICD-10-CM

## 2021-05-20 DIAGNOSIS — I25.10 CORONARY ARTERY DISEASE INVOLVING NATIVE CORONARY ARTERY OF NATIVE HEART WITHOUT ANGINA PECTORIS: ICD-10-CM

## 2021-05-20 DIAGNOSIS — Z95.5 PRESENCE OF DRUG COATED STENT IN LAD CORONARY ARTERY: ICD-10-CM

## 2021-05-20 DIAGNOSIS — E78.2 MIXED HYPERLIPIDEMIA: ICD-10-CM

## 2021-05-20 DIAGNOSIS — Z95.5 S/P RIGHT CORONARY ARTERY (RCA) STENT PLACEMENT: ICD-10-CM

## 2021-05-20 LAB
ALBUMIN SERPL-MCNC: 4.1 G/DL (ref 3.5–5.2)
ALP BLD-CCNC: 53 U/L (ref 40–129)
ALT SERPL-CCNC: 24 U/L (ref 0–40)
ANION GAP SERPL CALCULATED.3IONS-SCNC: 6 MMOL/L (ref 7–16)
AST SERPL-CCNC: 24 U/L (ref 0–39)
BASOPHILS ABSOLUTE: 0.04 E9/L (ref 0–0.2)
BASOPHILS RELATIVE PERCENT: 0.8 % (ref 0–2)
BILIRUB SERPL-MCNC: 0.5 MG/DL (ref 0–1.2)
BUN BLDV-MCNC: 17 MG/DL (ref 6–23)
CALCIUM SERPL-MCNC: 9.1 MG/DL (ref 8.6–10.2)
CHLORIDE BLD-SCNC: 107 MMOL/L (ref 98–107)
CO2: 28 MMOL/L (ref 22–29)
CREAT SERPL-MCNC: 0.8 MG/DL (ref 0.7–1.2)
D DIMER: <200 NG/ML DDU
EKG ATRIAL RATE: 51 BPM
EKG P AXIS: 19 DEGREES
EKG P-R INTERVAL: 182 MS
EKG Q-T INTERVAL: 466 MS
EKG QRS DURATION: 96 MS
EKG QTC CALCULATION (BAZETT): 429 MS
EKG R AXIS: -27 DEGREES
EKG T AXIS: 30 DEGREES
EKG VENTRICULAR RATE: 51 BPM
EOSINOPHILS ABSOLUTE: 0.16 E9/L (ref 0.05–0.5)
EOSINOPHILS RELATIVE PERCENT: 3.1 % (ref 0–6)
GFR AFRICAN AMERICAN: >60
GFR NON-AFRICAN AMERICAN: >60 ML/MIN/1.73
GLUCOSE BLD-MCNC: 119 MG/DL (ref 74–99)
HCT VFR BLD CALC: 39.9 % (ref 37–54)
HEMOGLOBIN: 13.6 G/DL (ref 12.5–16.5)
IMMATURE GRANULOCYTES #: 0.02 E9/L
IMMATURE GRANULOCYTES %: 0.4 % (ref 0–5)
LV EF: 65 %
LVEF MODALITY: NORMAL
LYMPHOCYTES ABSOLUTE: 1.01 E9/L (ref 1.5–4)
LYMPHOCYTES RELATIVE PERCENT: 19.8 % (ref 20–42)
MCH RBC QN AUTO: 29.1 PG (ref 26–35)
MCHC RBC AUTO-ENTMCNC: 34.1 % (ref 32–34.5)
MCV RBC AUTO: 85.3 FL (ref 80–99.9)
MONOCYTES ABSOLUTE: 0.42 E9/L (ref 0.1–0.95)
MONOCYTES RELATIVE PERCENT: 8.2 % (ref 2–12)
NEUTROPHILS ABSOLUTE: 3.45 E9/L (ref 1.8–7.3)
NEUTROPHILS RELATIVE PERCENT: 67.7 % (ref 43–80)
PDW BLD-RTO: 12.4 FL (ref 11.5–15)
PLATELET # BLD: 205 E9/L (ref 130–450)
PMV BLD AUTO: 9.4 FL (ref 7–12)
POTASSIUM SERPL-SCNC: 4.5 MMOL/L (ref 3.5–5)
PRO-BNP: 56 PG/ML (ref 0–125)
RBC # BLD: 4.68 E12/L (ref 3.8–5.8)
SODIUM BLD-SCNC: 141 MMOL/L (ref 132–146)
TOTAL PROTEIN: 6.7 G/DL (ref 6.4–8.3)
TROPONIN: <0.01 NG/ML (ref 0–0.03)
WBC # BLD: 5.1 E9/L (ref 4.5–11.5)

## 2021-05-20 PROCEDURE — A9500 TC99M SESTAMIBI: HCPCS | Performed by: RADIOLOGY

## 2021-05-20 PROCEDURE — 93010 ELECTROCARDIOGRAM REPORT: CPT | Performed by: INTERNAL MEDICINE

## 2021-05-20 PROCEDURE — 6370000000 HC RX 637 (ALT 250 FOR IP): Performed by: NURSE PRACTITIONER

## 2021-05-20 PROCEDURE — 93016 CV STRESS TEST SUPVJ ONLY: CPT | Performed by: INTERNAL MEDICINE

## 2021-05-20 PROCEDURE — 71045 X-RAY EXAM CHEST 1 VIEW: CPT

## 2021-05-20 PROCEDURE — 84484 ASSAY OF TROPONIN QUANT: CPT

## 2021-05-20 PROCEDURE — 78452 HT MUSCLE IMAGE SPECT MULT: CPT | Performed by: INTERNAL MEDICINE

## 2021-05-20 PROCEDURE — 93005 ELECTROCARDIOGRAM TRACING: CPT | Performed by: EMERGENCY MEDICINE

## 2021-05-20 PROCEDURE — 93018 CV STRESS TEST I&R ONLY: CPT | Performed by: INTERNAL MEDICINE

## 2021-05-20 PROCEDURE — G0378 HOSPITAL OBSERVATION PER HR: HCPCS

## 2021-05-20 PROCEDURE — 2580000003 HC RX 258: Performed by: NURSE PRACTITIONER

## 2021-05-20 PROCEDURE — 99285 EMERGENCY DEPT VISIT HI MDM: CPT

## 2021-05-20 PROCEDURE — 85025 COMPLETE CBC W/AUTO DIFF WBC: CPT

## 2021-05-20 PROCEDURE — 80053 COMPREHEN METABOLIC PANEL: CPT

## 2021-05-20 PROCEDURE — 85378 FIBRIN DEGRADE SEMIQUANT: CPT

## 2021-05-20 PROCEDURE — 36415 COLL VENOUS BLD VENIPUNCTURE: CPT

## 2021-05-20 PROCEDURE — 6370000000 HC RX 637 (ALT 250 FOR IP): Performed by: EMERGENCY MEDICINE

## 2021-05-20 PROCEDURE — 3430000000 HC RX DIAGNOSTIC RADIOPHARMACEUTICAL: Performed by: RADIOLOGY

## 2021-05-20 PROCEDURE — 93017 CV STRESS TEST TRACING ONLY: CPT

## 2021-05-20 PROCEDURE — 83880 ASSAY OF NATRIURETIC PEPTIDE: CPT

## 2021-05-20 PROCEDURE — 78452 HT MUSCLE IMAGE SPECT MULT: CPT

## 2021-05-20 PROCEDURE — 99215 OFFICE O/P EST HI 40 MIN: CPT | Performed by: INTERNAL MEDICINE

## 2021-05-20 RX ORDER — SODIUM CHLORIDE 0.9 % (FLUSH) 0.9 %
5-40 SYRINGE (ML) INJECTION EVERY 12 HOURS SCHEDULED
Status: DISCONTINUED | OUTPATIENT
Start: 2021-05-20 | End: 2021-05-25 | Stop reason: HOSPADM

## 2021-05-20 RX ORDER — ACETAMINOPHEN 325 MG/1
650 TABLET ORAL EVERY 6 HOURS PRN
Status: DISCONTINUED | OUTPATIENT
Start: 2021-05-20 | End: 2021-05-21 | Stop reason: SDUPTHER

## 2021-05-20 RX ORDER — ONDANSETRON 2 MG/ML
4 INJECTION INTRAMUSCULAR; INTRAVENOUS EVERY 6 HOURS PRN
Status: DISCONTINUED | OUTPATIENT
Start: 2021-05-20 | End: 2021-05-25 | Stop reason: HOSPADM

## 2021-05-20 RX ORDER — SODIUM CHLORIDE 0.9 % (FLUSH) 0.9 %
5-40 SYRINGE (ML) INJECTION PRN
Status: DISCONTINUED | OUTPATIENT
Start: 2021-05-20 | End: 2021-05-25 | Stop reason: HOSPADM

## 2021-05-20 RX ORDER — PROMETHAZINE HYDROCHLORIDE 25 MG/1
12.5 TABLET ORAL EVERY 6 HOURS PRN
Status: DISCONTINUED | OUTPATIENT
Start: 2021-05-20 | End: 2021-05-25 | Stop reason: HOSPADM

## 2021-05-20 RX ORDER — FLUOXETINE HYDROCHLORIDE 20 MG/1
40 CAPSULE ORAL DAILY
Status: DISCONTINUED | OUTPATIENT
Start: 2021-05-20 | End: 2021-05-25 | Stop reason: HOSPADM

## 2021-05-20 RX ORDER — SODIUM CHLORIDE 9 MG/ML
25 INJECTION, SOLUTION INTRAVENOUS PRN
Status: DISCONTINUED | OUTPATIENT
Start: 2021-05-20 | End: 2021-05-25 | Stop reason: HOSPADM

## 2021-05-20 RX ORDER — ACETAMINOPHEN 650 MG/1
650 SUPPOSITORY RECTAL EVERY 6 HOURS PRN
Status: DISCONTINUED | OUTPATIENT
Start: 2021-05-20 | End: 2021-05-25 | Stop reason: HOSPADM

## 2021-05-20 RX ORDER — POLYETHYLENE GLYCOL 3350 17 G/17G
17 POWDER, FOR SOLUTION ORAL DAILY PRN
Status: DISCONTINUED | OUTPATIENT
Start: 2021-05-20 | End: 2021-05-25 | Stop reason: HOSPADM

## 2021-05-20 RX ORDER — ASPIRIN 81 MG/1
81 TABLET, CHEWABLE ORAL DAILY
Status: DISCONTINUED | OUTPATIENT
Start: 2021-05-21 | End: 2021-05-25 | Stop reason: HOSPADM

## 2021-05-20 RX ORDER — ASPIRIN 81 MG/1
324 TABLET, CHEWABLE ORAL ONCE
Status: COMPLETED | OUTPATIENT
Start: 2021-05-20 | End: 2021-05-20

## 2021-05-20 RX ORDER — HYDROXYZINE HYDROCHLORIDE 50 MG/ML
25 INJECTION, SOLUTION INTRAMUSCULAR EVERY 6 HOURS PRN
Status: DISCONTINUED | OUTPATIENT
Start: 2021-05-20 | End: 2021-05-25 | Stop reason: HOSPADM

## 2021-05-20 RX ORDER — TAMSULOSIN HYDROCHLORIDE 0.4 MG/1
0.4 CAPSULE ORAL DAILY
Status: DISCONTINUED | OUTPATIENT
Start: 2021-05-20 | End: 2021-05-25 | Stop reason: HOSPADM

## 2021-05-20 RX ORDER — PANTOPRAZOLE SODIUM 40 MG/1
40 TABLET, DELAYED RELEASE ORAL
Status: DISCONTINUED | OUTPATIENT
Start: 2021-05-21 | End: 2021-05-25 | Stop reason: HOSPADM

## 2021-05-20 RX ORDER — TAMSULOSIN HYDROCHLORIDE 0.4 MG/1
0.4 CAPSULE ORAL DAILY
COMMUNITY
Start: 2021-05-17

## 2021-05-20 RX ORDER — NITROGLYCERIN 0.4 MG/1
0.4 TABLET SUBLINGUAL EVERY 5 MIN PRN
Status: DISCONTINUED | OUTPATIENT
Start: 2021-05-20 | End: 2021-05-25 | Stop reason: HOSPADM

## 2021-05-20 RX ORDER — ROSUVASTATIN CALCIUM 20 MG/1
40 TABLET, COATED ORAL NIGHTLY
Status: DISCONTINUED | OUTPATIENT
Start: 2021-05-20 | End: 2021-05-25 | Stop reason: HOSPADM

## 2021-05-20 RX ADMIN — ROSUVASTATIN 40 MG: 20 TABLET, FILM COATED ORAL at 20:48

## 2021-05-20 RX ADMIN — NITROGLYCERIN 0.4 MG: 0.4 TABLET SUBLINGUAL at 09:04

## 2021-05-20 RX ADMIN — SODIUM CHLORIDE, PRESERVATIVE FREE 10 ML: 5 INJECTION INTRAVENOUS at 20:48

## 2021-05-20 RX ADMIN — APIXABAN 5 MG: 5 TABLET, FILM COATED ORAL at 20:48

## 2021-05-20 RX ADMIN — Medication 10 MILLICURIE: at 14:15

## 2021-05-20 RX ADMIN — Medication 35 MILLICURIE: at 15:30

## 2021-05-20 RX ADMIN — ASPIRIN 81 MG CHEWABLE TABLET 324 MG: 81 TABLET CHEWABLE at 08:58

## 2021-05-20 ASSESSMENT — PAIN SCALES - GENERAL: PAINLEVEL_OUTOF10: 0

## 2021-05-20 NOTE — H&P
Hospitalist History & Physical      PCP: Ashtyn Valles, ANSELMO - CNP    Date of Admission: 5/20/2021    Date of Service: Pt seen/examined on 5/20/2021 and is Placed in Observation. Chief Complaint:  had concerns including Chest Pain (describes as tighteness for a few days radiating to left arm, Hx of 3 stents). History Of Present Illness:    Mr. Keri Leon is a 62y.o. year old male  who  has a past medical history of CAD (coronary artery disease), Cancer ([facility] Gastroesophageal reflux disease, Heart problem, Hyperlipidemia, and Prostate cancer ([facility] He presented to the ER on 5/20/21 with complaints of intermittent chest pain x 3 days. He reported that he has a history of CAD and has several episodes similar to this in the past. He has had a total of 4 heart caths, 1 [place], 2 in 2017 here at [facility] and 1 in 2019 out of state. He states that he has three LORAINE, that all intervention has been performed here and that he follows with Dr. Andrew Wilcox. He describes his chest pain as pressure located mid sternal and radiating to his bilateral shoulders. His wife states that with each episode he becomes diaphoretic. EKG showed SB rate 51 bpm without ischemic changes. Labs were unremarkable, troponin <0.01. CXR showed no acute findings. He was given 324 mg ASA and sublingual NTG x 1. Cardiology has been consulted. He is to be admitted for observation. Past Medical History:        Diagnosis Date    CAD (coronary artery disease)     12/20/17 alpine 3.0x28 loraine mid-distal rca    Cancer [facility])     prostate    Gastroesophageal reflux disease     Heart problem     has a blockage    Hyperlipidemia     Prostate cancer ([facility] 10/26/2015       Past Surgical History:        Procedure Laterality Date    CARDIAC SURGERY      Stent-5/2013    CORONARY ANGIOPLASTY WITH STENT PLACEMENT  12/05/2017    Dr. Andrew Wilcox - 3.5x 23 Xience Alpine LORAINE to the Prox LAD.     PROSTATE SURGERY      radioactive seeds implanted       Medications Prior to Admission:      Prior to Admission medications    Medication Sig Start Date End Date Taking? Authorizing Provider   tamsulosin (FLOMAX) 0.4 MG capsule  5/17/21  Yes Historical Provider, MD   apixaban (ELIQUIS) 5 MG TABS tablet Take 1 tablet by mouth 2 times daily 9/18/19  Yes BARBARA Blackwell   esomeprazole (NEXIUM) 20 MG delayed release capsule Take 20 mg by mouth daily  8/15/19  Yes Historical Provider, MD   FLUoxetine (PROZAC) 40 MG capsule Take 40 mg by mouth daily    Yes Historical Provider, MD   rosuvastatin (CRESTOR) 40 MG tablet Take 40 mg by mouth nightly  2/20/18  Yes Historical Provider, MD   hydrOXYzine (VISTARIL) 25 MG capsule Take 25 mg by mouth daily   Yes Historical Provider, MD   Coenzyme Q10 (CO Q 10) 100 MG CAPS Take 100 mg by mouth nightly   Yes Historical Provider, MD   aspirin 81 MG tablet Take 81 mg by mouth nightly    Yes Historical Provider, MD   nitroGLYCERIN (NITROSTAT) 0.4 MG SL tablet up to max of 3 total doses. If no relief after 1 dose, call 911. 1/3/18   BARBARA Jordan       Allergies:  Patient has no known allergies. Social History:    RESIDENCE: Home  TOBACCO:   reports that he has never smoked. He has never used smokeless tobacco.  ETOH:   reports current alcohol use. Family History:    As follows:      Problem Relation Age of Onset    High Blood Pressure Mother     Heart Disease Father     No Known Problems Sister     No Known Problems Brother     No Known Problems Sister        REVIEW OF SYSTEMS:   Pertinent positives as noted in the HPI. All other systems reviewed and negative. PHYSICAL EXAM:  BP (!) 165/74   Pulse (!) 49   Temp 98.2 °F (36.8 °C)   Resp 18   Wt 220 lb (99.8 kg)   SpO2 97%   BMI 34.46 kg/m²   General appearance: No apparent distress, appears stated age and cooperative. HEENT: Normal cephalic, atraumatic without obvious deformity. Pupils equal, round, and reactive to light.   Extra ocular muscles intact. Conjunctivae/corneas clear. Neck: Supple, with full range of motion. No jugular venous distention. Trachea midline. Respiratory:  Clear to auscultation bilaterally. No apparent distress. Cardiovascular:  Bradycardic, regular rate in the 40s. Bedside cardiac monitor displaying SB. S1/S2. PV: Brisk capillary refill. +2 pedal and radial pulses bilaterally. Trace edema BLE. Abdomen: Soft, non-tender, non-distended. +BS  Musculoskeletal: No obvious deformities or erythematous or edematous joints. Skin: Normal skin color. No rashes or lesions. Neurologic:  Neurovascularly intact without any focal sensory/motor deficits. Psychiatric: Alert and oriented, thought content appropriate, normal insight    Reviewed EKG and CXR personally      CBC:   Recent Labs     05/20/21  0903   WBC 5.1   RBC 4.68   HGB 13.6   HCT 39.9   MCV 85.3   RDW 12.4        BMP:   Recent Labs     05/20/21  0903      K 4.5      CO2 28   BUN 17   CREATININE 0.8     LFT:  Recent Labs     05/20/21  0903   PROT 6.7   ALKPHOS 53   ALT 24   AST 24   BILITOT 0.5     CE:  Recent Labs     05/20/21  0903   TROPONINI <0.01     PT/INR: No results for input(s): INR, APTT in the last 72 hours. BNP: No results for input(s): BNP in the last 72 hours.   ESR: No results found for: SEDRATE  CRP: No results found for: CRP  D Dimer:   Lab Results   Component Value Date    DDIMER 0.53 (H) 09/10/2019      Folate and B12: No results found for: Marty Voss, No results found for: FOLATE  Lactic Acid: No results found for: LACTA  Thyroid Studies:   Lab Results   Component Value Date    TSH 1.920 12/03/2017       Oupatient labs:  Lab Results   Component Value Date    CHOL 138 10/27/2015    TRIG 70 10/27/2015    HDL 47 12/04/2017    LDLCALC 94 12/04/2017    TSH 1.920 12/03/2017    INR 1.1 12/20/2017       Urinalysis:  No results found for: NITRU, WBCUA, BACTERIA, RBCUA, BLOODU, SPECGRAV, GLUCOSEU    Imaging:  XR CHEST PORTABLE    Result Date: 5/20/2021  No acute process. ASSESSMENT/PLAN:    Chest pain- intermittent x 3 days, described as pressure located mid sternal radiating to bilateral shoulders. Occurring at rest. EKG with SB no ischemic changes, troponin negative. Given ASA and SL NTG x 1 in ED. Cardiology consulted. NPO for now. CAD- s/p PCI with EMBER x 3- appears to have stenting to LAD x 1 and RCA x2. Most recent cardiac cath 2019 in [facility] with no intervention. Follows with Dr. Nina Mckinney. Sinus bradycardia- baseline HR 45-55 bpm, evaluated by EP in the past. No indication for pacemaker. HLD- continue statin    Hx of PE anticoagulated with apixaban    GERD    Prostate CA- s/p seed implant, follows at [address] [place] [address]; on flomax    Anxiety and depression- continue fluoxetine and hydroxyzine      Diet: No diet orders on file  Code Status: Prior  Surrogate decision maker confirmed with patient:   Extended Emergency Contact Information  Primary Emergency Contact: Renato Humphreysn  Address: [address] Phone: [phone]  Work Phone: [phone]  Mobile Phone: [phone]  Relation: Spouse   needed? No    DVT Prophylaxis: []Lovenox []Heparin []PCD [x] [address] []Encouraged ambulation  Disposition: []Med/Surg [x] Intermediate [] ICU/CCU  Admit status: [x] Observation [] Inpatient     +++++++++++++++++++++++++++++++++++++++++++++++++  [address] [place], [place]  +++++++++++++++++++++++++++++++++++++++++++++++++  NOTE: This report was transcribed using voice recognition software. Every effort was made to ensure accuracy; however, inadvertent computerized transcription errors may be present.

## 2021-05-20 NOTE — CONSULTS
Barbara Woodson  1960  Date of Service: 5/20/2021    Reason for Consultation: We were asked to see Barbara Woodson by Dr. Juan C Durbin, ANSELMO - CNP  regarding chest pain. History of Chief Complaint: This is a 62 y.o. male with a history of coronary artery disease, hypercholesterolemia, chronic bradycardia, GERD, prostate cancer. He states that he has been having constant left-sided chest pressure for a week. It intermittently radiates to his back and bilateral shoulders. He also admits to dyspnea with exertion and lightheadedness with exertion for the past week. He denies any orthopnea/PND. He denies any palpitations, or syncope, or near syncope. REVIEW OF SYSTEMS:   Heart: as above   Lungs: as above   Eyes: denies changes in vision or discharge. Ears: denies changes in hearing or pain. Nose: denies epistaxis or masses   Throat: denies sore throat or trouble swallowing. Neuro: denies numbness, tingling, tremors. Skin: denies rashes or itching. : denies hematuria, dysuria   GI: denies vomiting, diarrhea   Psych: denies mood changed, anxiety, depression.     CURRENT MEDICATIONS:  Current Facility-Administered Medications   Medication Dose Route Frequency Provider Last Rate Last Admin    nitroGLYCERIN (NITROSTAT) SL tablet 0.4 mg  0.4 mg Sublingual Q5 Min PRN Xavier Alas MD   0.4 mg at 05/20/21 0904     Current Outpatient Medications   Medication Sig Dispense Refill    tamsulosin (FLOMAX) 0.4 MG capsule       apixaban (ELIQUIS) 5 MG TABS tablet Take 1 tablet by mouth 2 times daily 60 tablet 0    esomeprazole (NEXIUM) 20 MG delayed release capsule Take 20 mg by mouth daily   0    FLUoxetine (PROZAC) 40 MG capsule Take 40 mg by mouth daily       rosuvastatin (CRESTOR) 40 MG tablet Take 40 mg by mouth nightly       hydrOXYzine (VISTARIL) 25 MG capsule Take 25 mg by mouth daily      Coenzyme Q10 (CO Q 10) 100 MG CAPS Take 100 mg by mouth nightly      aspirin 81 MG tablet Take 81 mg by mouth nightly       nitroGLYCERIN (NITROSTAT) 0.4 MG SL tablet up to max of 3 total doses. If no relief after 1 dose, call 911. 94 tablet 3        ALLERGIES:  No Known Allergies    MEDICAL HISTORY:  Past Medical History:   Diagnosis Date    CAD (coronary artery disease)     12/20/17 alpine 3.0x28 loraine mid-distal rca    Cancer [facility])     prostate    Gastroesophageal reflux disease     Heart problem     has a blockage    Hyperlipidemia     Pro[facility] ([facility] 10/26/2015       SURGICAL HISTORY:  Past Surgical History:   Procedure Laterality Date    CARDIAC SURGERY      Stent-5/2013    CORONARY ANGIOPLASTY WITH STENT PLACEMENT  12/05/2017    Dr. Xenia Lloyd - 3.5x 23 Xience Alpine LORAINE to the Prox LAD.  PROSTATE SURGERY      radioactive seeds implanted       FAMILY HISTORY:  Family History   Problem Relation Age of Onset    High Blood Pressure Mother     Heart Disease Father     No Known Problems Sister     No Known Problems Brother     No Known Problems Sister        SOCIAL HISTORY:  Social History     Socioeconomic History    Marital status:      Spouse name: None    Number of children: None    Years of education: None    Highest education level: None   Occupational History    None   Tobacco Use    Smoking status: Never Smoker    Smokeless tobacco: Never Used   Vaping Use    Vaping Use: Never used   Substance and Sexual Activity    Alcohol use: Yes     Comment: Occassionally     Drug use: No    Sexual activity: None   Other Topics Concern    None   Social History Narrative    None     Social Determinants of Health     Financial Resource Strain:     Difficulty of Paying Living Expenses:    Food Insecurity:     Worried About Running Out of Food in the Last Year:     Ran Out of Food in the Last Year:    Transportation Needs:     Lack of Transportation (Medical):      Lack of Transportation (Non-Medical):    Physical Activity:     Days of Exercise per Week:     Minutes of Exercise per 05/20/21  0903   TROPONINI <0.01     No results for input(s): BNP in the last 72 hours. No results for input(s): CHOL, HDL, TRIG in the last 72 hours. Invalid input(s): CHOLHDLR, LDLCALCU    Assessment:   1. Constant chest discomfort for a week that increases with activities. Negative ECG and negative initial troponin. 2. Chronic sinus bradycardia. However, he is now having some lightheadedness and dyspnea with activities. Possible chronotropic incompetence. 3. Hypercholesterolemia  4. Prostate cancer  5. GERD      Recommendations:  1. Exercise Cardiolite stress test.    Thank you for allowing me to participate in your patient's care. [address], [address] [facility], [facility]  Interventional Cardiology    Note: This report was completed using computerized voice recognition software. Every effort has been made to ensure accuracy, however; and invert and computerized transcription errors may be present. Addendum:  His stress test is abnormal for ischemia. I discussed these results with his wife and him. I discussed a cardiac catheterization possible PCI for him for tomorrow. CATH RISKS:  I discussed the risks and benefits of cardiac catheterization and percutaneous coronary intervention including but not limited to exposure to radiation, bleeding, infection, sedation, allergy, peripheral embolization, acute renal failure, vascular damage, emergent CABG, CVA, MI, and death. He/she states that he/she understands this and agrees to proceed.

## 2021-05-20 NOTE — ED PROVIDER NOTES
Department of Emergency Medicine   ED  Provider Note  Admit Date/RoomTime: 5/20/2021  8:14 AM  ED Room: 01/01          History of Present Illness:  5/20/21, Time: 8:23 AM EDT  Chief Complaint   Patient presents with    Chest Pain     describes as tighteness for a few days radiating to left arm, Hx of 3 stents                Keri Leon is a 62 y.o. male presenting to the ED for chest pain. Patient states he been having an intermittent chest tightness, substernal, came on gradually, nothing makes it better or worse, for the past 3 days. He has had this in the past.  He does have history of 3 stents. States that usually develops, pain, they cycle his cardiac enzymes, and ends up with an urgent cardiac cath. He has never had a true STEMI per his report. Last stent was back in 2019. He also has a history of PE, he is on Eliquis, he is compliant with it. Denies fever, chills, nausea, vomit, cough, sputum, shortness of breath, change in bowel or bladder, lethargy, or any other symptoms or complaints. Review of Systems:   Pertinent positives and negatives are stated within HPI, all other systems reviewed and are negative.        --------------------------------------------- PAST HISTORY ---------------------------------------------  Past Medical History:  has a past medical history of CAD (coronary artery disease), Cancer ([facility] [address] Gastroesophageal reflux disease, Heart problem, Hyperlipidemia, and [facility] Past Surgical History:  has a past surgical history that includes Prostate surgery; Cardiac surgery; and Coronary angioplasty with stent (12/05/2017). Social History:  reports that he has never smoked. He has never used smokeless tobacco. He reports current alcohol use. He reports that he does not use drugs. Family History: family history includes Heart Disease in his father; High Blood Pressure in his mother; No Known Problems in his brother, sister, and sister. . Unless otherwise noted, family history is non contributory    The patients home medications have been reviewed. Allergies: Patient has no known allergies. ---------------------------------------------------PHYSICAL EXAM--------------------------------------    Constitutional/General: Alert and oriented x3  Head: Normocephalic and atraumatic  Eyes: PERRL, EOMI, sclera non icteric  Mouth: Oropharynx clear, handling secretions, no trismus, no asymmetry of the posterior oropharynx or uvular edema  Neck: Supple, full ROM, no stridor, no meningeal signs  Respiratory: Lungs clear to auscultation bilaterally, no wheezes, rales, or rhonchi. Not in respiratory distress  Cardiovascular:  Regular rate. Regular rhythm. 2+ distal pulses. Equal extremity pulses. Chest: No chest wall tenderness  GI:  Abdomen Soft, Non tender, Non distended. No rebound, guarding, or rigidity. No pulsatile masses. Musculoskeletal: Moves all extremities x 4. Warm and well perfused, no clubbing, cyanosis, or edema. Capillary refill <3 seconds  Integument: skin warm and dry. No rashes. Neurologic: GCS 15, no focal deficits, symmetric strength 5/5 in the upper and lower extremities bilaterally  Psychiatric: Normal Affect          -------------------------------------------------- RESULTS -------------------------------------------------  I have personally reviewed all laboratory and imaging results for this patient. Results are listed below.      LABS: (Lab results interpreted by me)  Results for orders placed or performed during the hospital encounter of 05/20/21   CBC Auto Differential   Result Value Ref Range    WBC 5.1 4.5 - 11.5 E9/L    RBC 4.68 3.80 - 5.80 E12/L    Hemoglobin 13.6 12.5 - 16.5 g/dL    Hematocrit 39.9 37.0 - 54.0 %    MCV 85.3 80.0 - 99.9 fL    MCH 29.1 26.0 - 35.0 pg    MCHC 34.1 32.0 - 34.5 %    RDW 12.4 11.5 - 15.0 fL    Platelets 815 528 - 673 E9/L    MPV 9.4 7.0 - 12.0 fL    Neutrophils % 67.7 43.0 - 80.0 %    Immature Granulocytes % 0.4 0.0 - 5.0 %    Lymphocytes % 19.8 (L) 20.0 - 42.0 %    Monocytes % 8.2 2.0 - 12.0 %    Eosinophils % 3.1 0.0 - 6.0 %    Basophils % 0.8 0.0 - 2.0 %    Neutrophils Absolute 3.45 1.80 - 7.30 E9/L    Immature Granulocytes # 0.02 E9/L    Lymphocytes Absolute 1.01 (L) 1.50 - 4.00 E9/L    Monocytes Absolute 0.42 0.10 - 0.95 E9/L    Eosinophils Absolute 0.16 0.05 - 0.50 E9/L    Basophils Absolute 0.04 0.00 - 0.20 E9/L   Comprehensive Metabolic Panel   Result Value Ref Range    Sodium 141 132 - 146 mmol/L    Potassium 4.5 3.5 - 5.0 mmol/L    Chloride 107 98 - 107 mmol/L    CO2 28 22 - 29 mmol/L    Anion Gap 6 (L) 7 - 16 mmol/L    Glucose 119 (H) 74 - 99 mg/dL    BUN 17 6 - 23 mg/dL    CREATININE 0.8 0.7 - 1.2 mg/dL    GFR Non-African American >60 >=60 mL/min/1.73    GFR African American >60     Calcium 9.1 8.6 - 10.2 mg/dL    Total Protein 6.7 6.4 - 8.3 g/dL    Albumin 4.1 3.5 - 5.2 g/dL    Total Bilirubin 0.5 0.0 - 1.2 mg/dL    Alkaline Phosphatase 53 40 - 129 U/L    ALT 24 0 - 40 U/L    AST 24 0 - 39 U/L   Troponin   Result Value Ref Range    Troponin <0.01 0.00 - 0.03 ng/mL   Brain Natriuretic Peptide   Result Value Ref Range    Pro-BNP 56 0 - 125 pg/mL   EKG 12 Lead   Result Value Ref Range    Ventricular Rate 51 BPM    Atrial Rate 51 BPM    P-R Interval 182 ms    QRS Duration 96 ms    Q-T Interval 466 ms    QTc Calculation (Bazett) 429 ms    P Axis 19 degrees    R Axis -27 degrees    T Axis 30 degrees   ,       RADIOLOGY:  Interpreted by Radiologist unless otherwise specified  XR CHEST PORTABLE   Final Result   No acute process.                EKG Interpretation  Interpreted by emergency department physician, Dr. Emir Gregory, rate 51, no STEMI        ------------------------- NURSING NOTES AND VITALS REVIEWED ---------------------------   The nursing notes within the ED encounter and vital signs as below have been reviewed by myself  BP (!) 165/74   Pulse (!) 49   Temp 98.2 °F (36.8 °C)   Resp 18   Wt 220 lb (99.8 kg)   SpO2 97%   BMI 34.46 kg/m²     Oxygen Saturation Interpretation: Normal    The patients available past medical records and past encounters were reviewed. ------------------------------ ED COURSE/MEDICAL DECISION MAKING----------------------  Medications   nitroGLYCERIN (NITROSTAT) SL tablet 0.4 mg (0.4 mg Sublingual Given 5/20/21 0904)   aspirin chewable tablet 324 mg (324 mg Oral Given 5/20/21 0816)           The cardiac monitor revealed sinus with a heart rate in the 50s as interpreted by me. The cardiac monitor was ordered secondary to the patient's chest pain and to monitor the patient for dysrhythmia. CPT 65413         Medical Decision Making:    Labs and imaging reviewed, reeval, resting, no complaints. Discussed with the hospitalist, patient will be admitted. Cards consulted. Counseling: The emergency provider has spoken with the patient and discussed todays results, in addition to providing specific details for the plan of care and counseling regarding the diagnosis and prognosis. Questions are answered at this time and they are agreeable with the plan.       --------------------------------- IMPRESSION AND DISPOSITION ---------------------------------    IMPRESSION  1. Chest pain, unspecified type        DISPOSITION  Disposition: Admit to telemetry  Patient condition is stable        NOTE: This report was transcribed using voice recognition software.  Every effort was made to ensure accuracy; however, inadvertent computerized transcription errors may be present        Terry Hernández MD  05/20/21 7595

## 2021-05-20 NOTE — PROGRESS NOTES
Exercise Nuclear Stress Test:    Reason for study: Chest pain, CAD    Resting EKG showed sinus rhyhtm    Exercised for 8min and 1sec, achieved  90% of THR and  10 METS. EK to 1.5mm ST depression in V3-V6, resolved in recovery. No arrhythmia during treadmill stress. BP:  Peak /80 mmHg. His SBP dropped from 150 to 132 in stage I. Symptoms: No chest pain, No short of breath    Post test complications: None          SPECT image report pending.       Electronically signed by Aniya Suarez MD on 2021 at 3:45 PM

## 2021-05-21 LAB
ABO/RH: NORMAL
ALBUMIN SERPL-MCNC: 4.1 G/DL (ref 3.5–5.2)
ALP BLD-CCNC: 56 U/L (ref 40–129)
ALT SERPL-CCNC: 19 U/L (ref 0–40)
ANION GAP SERPL CALCULATED.3IONS-SCNC: 9 MMOL/L (ref 7–16)
ANTIBODY SCREEN: NORMAL
AST SERPL-CCNC: 22 U/L (ref 0–39)
BASOPHILS ABSOLUTE: 0.03 E9/L (ref 0–0.2)
BASOPHILS RELATIVE PERCENT: 0.4 % (ref 0–2)
BILIRUB SERPL-MCNC: 0.6 MG/DL (ref 0–1.2)
BUN BLDV-MCNC: 12 MG/DL (ref 6–23)
CALCIUM SERPL-MCNC: 8.8 MG/DL (ref 8.6–10.2)
CHLORIDE BLD-SCNC: 105 MMOL/L (ref 98–107)
CO2: 25 MMOL/L (ref 22–29)
CREAT SERPL-MCNC: 0.7 MG/DL (ref 0.7–1.2)
EOSINOPHILS ABSOLUTE: 0.23 E9/L (ref 0.05–0.5)
EOSINOPHILS RELATIVE PERCENT: 3.3 % (ref 0–6)
GFR AFRICAN AMERICAN: >60
GFR NON-AFRICAN AMERICAN: >60 ML/MIN/1.73
GLUCOSE BLD-MCNC: 100 MG/DL (ref 74–99)
HCT VFR BLD CALC: 45.6 % (ref 37–54)
HEMOGLOBIN: 14.4 G/DL (ref 12.5–16.5)
IMMATURE GRANULOCYTES #: 0.02 E9/L
IMMATURE GRANULOCYTES %: 0.3 % (ref 0–5)
LYMPHOCYTES ABSOLUTE: 1.65 E9/L (ref 1.5–4)
LYMPHOCYTES RELATIVE PERCENT: 23.6 % (ref 20–42)
MCH RBC QN AUTO: 28.2 PG (ref 26–35)
MCHC RBC AUTO-ENTMCNC: 31.6 % (ref 32–34.5)
MCV RBC AUTO: 89.2 FL (ref 80–99.9)
MONOCYTES ABSOLUTE: 0.47 E9/L (ref 0.1–0.95)
MONOCYTES RELATIVE PERCENT: 6.7 % (ref 2–12)
NEUTROPHILS ABSOLUTE: 4.58 E9/L (ref 1.8–7.3)
NEUTROPHILS RELATIVE PERCENT: 65.7 % (ref 43–80)
PDW BLD-RTO: 12.4 FL (ref 11.5–15)
PLATELET # BLD: 207 E9/L (ref 130–450)
PMV BLD AUTO: 10.3 FL (ref 7–12)
POTASSIUM REFLEX MAGNESIUM: 4.2 MMOL/L (ref 3.5–5)
RBC # BLD: 5.11 E12/L (ref 3.8–5.8)
SODIUM BLD-SCNC: 139 MMOL/L (ref 132–146)
TOTAL PROTEIN: 6.8 G/DL (ref 6.4–8.3)
WBC # BLD: 7 E9/L (ref 4.5–11.5)

## 2021-05-21 PROCEDURE — 85025 COMPLETE CBC W/AUTO DIFF WBC: CPT

## 2021-05-21 PROCEDURE — 86850 RBC ANTIBODY SCREEN: CPT

## 2021-05-21 PROCEDURE — C1769 GUIDE WIRE: HCPCS

## 2021-05-21 PROCEDURE — 86901 BLOOD TYPING SEROLOGIC RH(D): CPT

## 2021-05-21 PROCEDURE — 6370000000 HC RX 637 (ALT 250 FOR IP)

## 2021-05-21 PROCEDURE — 93458 L HRT ARTERY/VENTRICLE ANGIO: CPT | Performed by: INTERNAL MEDICINE

## 2021-05-21 PROCEDURE — 86900 BLOOD TYPING SEROLOGIC ABO: CPT

## 2021-05-21 PROCEDURE — C1760 CLOSURE DEV, VASC: HCPCS

## 2021-05-21 PROCEDURE — 2500000003 HC RX 250 WO HCPCS

## 2021-05-21 PROCEDURE — B2111ZZ FLUOROSCOPY OF MULTIPLE CORONARY ARTERIES USING LOW OSMOLAR CONTRAST: ICD-10-PCS | Performed by: INTERNAL MEDICINE

## 2021-05-21 PROCEDURE — G0378 HOSPITAL OBSERVATION PER HR: HCPCS

## 2021-05-21 PROCEDURE — 6360000002 HC RX W HCPCS

## 2021-05-21 PROCEDURE — 80053 COMPREHEN METABOLIC PANEL: CPT

## 2021-05-21 PROCEDURE — 2580000003 HC RX 258: Performed by: NURSE PRACTITIONER

## 2021-05-21 PROCEDURE — 6370000000 HC RX 637 (ALT 250 FOR IP): Performed by: NURSE PRACTITIONER

## 2021-05-21 PROCEDURE — C1894 INTRO/SHEATH, NON-LASER: HCPCS

## 2021-05-21 PROCEDURE — 4A023N7 MEASUREMENT OF CARDIAC SAMPLING AND PRESSURE, LEFT HEART, PERCUTANEOUS APPROACH: ICD-10-PCS | Performed by: INTERNAL MEDICINE

## 2021-05-21 PROCEDURE — 36415 COLL VENOUS BLD VENIPUNCTURE: CPT

## 2021-05-21 PROCEDURE — 2709999900 HC NON-CHARGEABLE SUPPLY

## 2021-05-21 PROCEDURE — B2131ZZ FLUOROSCOPY OF MULTIPLE CORONARY ARTERY BYPASS GRAFTS USING LOW OSMOLAR CONTRAST: ICD-10-PCS | Performed by: INTERNAL MEDICINE

## 2021-05-21 RX ORDER — ACETAMINOPHEN 325 MG/1
650 TABLET ORAL EVERY 4 HOURS PRN
Status: DISCONTINUED | OUTPATIENT
Start: 2021-05-21 | End: 2021-05-25 | Stop reason: HOSPADM

## 2021-05-21 RX ADMIN — FLUOXETINE HYDROCHLORIDE 40 MG: 20 CAPSULE ORAL at 08:14

## 2021-05-21 RX ADMIN — SODIUM CHLORIDE, PRESERVATIVE FREE 10 ML: 5 INJECTION INTRAVENOUS at 08:16

## 2021-05-21 RX ADMIN — TAMSULOSIN HYDROCHLORIDE 0.4 MG: 0.4 CAPSULE ORAL at 08:14

## 2021-05-21 NOTE — PROGRESS NOTES
Hospitalist Progress Note      Synopsis: Patient admitted on 5/20/2021 for chest pain. Stress test positive. Going for cardiac cath today with cardiology     Subjective    Clinically improving. Feeling better. Stable overnight. No other overnight issues reported. No CP, SOB, palpitations, blurred vision, HA, lightheadedness, LOC or focal neurological deficits    Exam:  /74   Pulse (!) 43   Temp 98.1 °F (36.7 °C) (Temporal)   Resp 16   Ht 5' 7\" (1.702 m)   Wt 220 lb (99.8 kg)   SpO2 96%   BMI 34.46 kg/m²   General appearance: No apparent distress, appears stated age and cooperative. HEENT: Pupils equal, round, and reactive to light. Conjunctivae/corneas clear. Neck: Supple. No jugular venous distention. Trachea midline. Respiratory:  Normal respiratory effort. Clear to auscultation, bilaterally without Rales/Wheezes/Rhonchi. Cardiovascular: Regular rate and rhythm with normal S1/S2 without murmurs, rubs or gallops. Abdomen: Soft, non-tender, non-distended with normal bowel sounds. Musculoskeletal: No clubbing, cyanosis or edema bilaterally. Brisk capillary refill. 2+ lower extremity pulses (dorsalis pedis).    Skin:  No rashes    Neurologic: awake, alert and following commands     Medications:  Reviewed    Infusion Medications    sodium chloride       Scheduled Medications    sodium chloride flush  5-40 mL Intravenous 2 times per day    tamsulosin  0.4 mg Oral Daily    apixaban  5 mg Oral BID    pantoprazole  40 mg Oral QAM AC    FLUoxetine  40 mg Oral Daily    rosuvastatin  40 mg Oral Nightly    aspirin  81 mg Oral Daily     PRN Meds: nitroGLYCERIN, sodium chloride flush, sodium chloride, promethazine **OR** ondansetron, polyethylene glycol, acetaminophen **OR** acetaminophen, hydrOXYzine    I/O  No intake or output data in the 24 hours ending 05/21/21 1547    Labs:   Recent Labs     05/20/21  0903 05/21/21  1030   WBC 5.1 7.0   HGB 13.6 14.4   HCT 39.9 45.6    207 Recent Labs     05/20/21  0903 05/21/21  1030    139   K 4.5 4.2    105   CO2 28 25   BUN 17 12   CREATININE 0.8 0.7   CALCIUM 9.1 8.8       Recent Labs     05/20/21  0903 05/21/21  1030   PROT 6.7 6.8   ALKPHOS 53 56   ALT 24 19   AST 24 22   BILITOT 0.5 0.6       No results for input(s): INR in the last 72 hours. Recent Labs     05/20/21  0903 05/20/21  1258 05/20/21  1708   TROPONINI <0.01 <0.01 <0.01       Chronic labs:  Lab Results   Component Value Date    CHOL 138 10/27/2015    TRIG 70 10/27/2015    HDL 47 12/04/2017    LDLCALC 94 12/04/2017    TSH 1.920 12/03/2017    INR 1.1 12/20/2017       Radiology:  Imaging studies reviewed today. ASSESSMENT:  Chest pain with abnormal stress test  Hx of CAD  Sinus bradycardia  HLD  Hx of PE on eliquis  GERD  Prostate cancer  Anxiety and depression      PLAN:  Appreciate cardiology input; for cardiac cath today  Strict I/O, monitor renal function  Continue home meds as ordered  EKG prn chest pain  Follow up results of cardiac cath    Discussed with pt at wife at bedside on 5/21/21       Diet: Diet NPO, After Midnight Exceptions are: Sips with Meds  Code Status: Full Code  PT/OT Eval Status:   As needed  DVT Prophylaxis:   eliquis  Recommended disposition at discharge:  home at [place]     +++++++++++++++++++++++++++++++++++++++++++++++++  Andrew Smiley MD   [facility].  +++++++++++++++++++++++++++++++++++++++++++++++++  NOTE: This report was transcribed using voice recognition software. Every effort was made to ensure accuracy; however, inadvertent computerized transcription errors may be present.

## 2021-05-21 NOTE — PROCEDURES
Procedure:    1. Left heart catheterization    Physician: Lorrie Rich. Mohamud JONES Assistant: none    Indication: UA  AUC: 8  AUC indication: 4    Complications: None    Anesthesia: Xylocaine, fentanyl     Sedation: Versed    Sedation time: I was present for sedation administration [id number]. I ended sedation administration at 1918for a total face-to-face sedation time of25 min. Estimated blood loss: minimal    Specimens: none    Contrast used: 45cc    Hemodynamics:  Opening Aortic pressure: 806/30  LV systolic pressure: 906  LVEDP: 0  No significant gradient across the aortic valve. Angiographic Results/findings:  Left main: No angiographically significant stenosis. LAD: Aneurysmal formation in the very proximal LAD. Proximal stent is widely patent. Wraparound vessel supplying the distal inferior wall. D1: Tiny vessel. D2: No angiographically significant stenosis. Circumflex: Ostial 75 to 80% stenosis extending to the left main. No landing zone. OM1: No angiographically significant stenosis. Right coronary artery: Diffuse luminal irregularities. Distal stent demonstrates the midportion to be on a bend with a 20 to 30% in-stent restenosis. PDA: Small vessel. PLB: Large vessel. No angiographically significant stenosis. Procedure:   After obtaining informed consent the patient was taken to the cardiac Cath Lab where the area over the right femoral artery was prepped and draped in a sterile fashion. Using a Seldinger technique and with fluoroscopic and ultrasound guidance a 5French sheath was placed in the right femoral artery. This was aspirated and flushed several times throughout the procedure. A 5 [place] JL4 diagnostic catheter was advanced over a wire to the aortic root. This was aspirated and flushed with saline. Pressures were obtained. This was too short and was changed to an L5. This was then manipulated into the left main coronary artery. 4 orthogonal views were obtained.   A 5

## 2021-05-21 NOTE — CARE COORDINATION
5/21/21 Transition of Care: patient is observation level of care due to chest pain. He had a positive stress test yesterday. He is npo for a heart cath today. He is alert and oriented and independent. He follows with ANSELMO Lopez CNP and his pharmacy is [facility] in [place]. He plans on returning home at discharge.  Flora Palm RN CM

## 2021-05-22 PROCEDURE — 6370000000 HC RX 637 (ALT 250 FOR IP): Performed by: NURSE PRACTITIONER

## 2021-05-22 PROCEDURE — 99222 1ST HOSP IP/OBS MODERATE 55: CPT | Performed by: THORACIC SURGERY (CARDIOTHORACIC VASCULAR SURGERY)

## 2021-05-22 PROCEDURE — 2140000000 HC CCU INTERMEDIATE R&B

## 2021-05-22 PROCEDURE — 6360000002 HC RX W HCPCS: Performed by: INTERNAL MEDICINE

## 2021-05-22 PROCEDURE — 6370000000 HC RX 637 (ALT 250 FOR IP): Performed by: INTERNAL MEDICINE

## 2021-05-22 PROCEDURE — 2580000003 HC RX 258: Performed by: INTERNAL MEDICINE

## 2021-05-22 PROCEDURE — 99233 SBSQ HOSP IP/OBS HIGH 50: CPT | Performed by: INTERNAL MEDICINE

## 2021-05-22 RX ORDER — NITROGLYCERIN 0.4 MG/1
0.4 TABLET SUBLINGUAL EVERY 5 MIN PRN
Status: DISCONTINUED | OUTPATIENT
Start: 2021-05-22 | End: 2021-05-22 | Stop reason: SDUPTHER

## 2021-05-22 RX ORDER — TAMSULOSIN HYDROCHLORIDE 0.4 MG/1
0.4 CAPSULE ORAL DAILY
Status: DISCONTINUED | OUTPATIENT
Start: 2021-05-22 | End: 2021-05-22 | Stop reason: SDUPTHER

## 2021-05-22 RX ORDER — FLUOXETINE HYDROCHLORIDE 20 MG/1
40 CAPSULE ORAL DAILY
Status: DISCONTINUED | OUTPATIENT
Start: 2021-05-22 | End: 2021-05-22 | Stop reason: SDUPTHER

## 2021-05-22 RX ORDER — ROSUVASTATIN CALCIUM 20 MG/1
40 TABLET, COATED ORAL NIGHTLY
Status: DISCONTINUED | OUTPATIENT
Start: 2021-05-22 | End: 2021-05-22 | Stop reason: SDUPTHER

## 2021-05-22 RX ORDER — HYDROXYZINE PAMOATE 25 MG/1
25 CAPSULE ORAL DAILY
Status: DISCONTINUED | OUTPATIENT
Start: 2021-05-22 | End: 2021-05-25 | Stop reason: HOSPADM

## 2021-05-22 RX ADMIN — ASPIRIN 81 MG CHEWABLE TABLET 81 MG: 81 TABLET CHEWABLE at 08:57

## 2021-05-22 RX ADMIN — TAMSULOSIN HYDROCHLORIDE 0.4 MG: 0.4 CAPSULE ORAL at 08:57

## 2021-05-22 RX ADMIN — PANTOPRAZOLE SODIUM 40 MG: 40 TABLET, DELAYED RELEASE ORAL at 05:50

## 2021-05-22 RX ADMIN — ENOXAPARIN SODIUM 100 MG: 100 INJECTION SUBCUTANEOUS at 20:48

## 2021-05-22 RX ADMIN — SODIUM CHLORIDE, PRESERVATIVE FREE 10 ML: 5 INJECTION INTRAVENOUS at 20:48

## 2021-05-22 RX ADMIN — FLUOXETINE HYDROCHLORIDE 40 MG: 20 CAPSULE ORAL at 08:57

## 2021-05-22 RX ADMIN — ROSUVASTATIN 40 MG: 20 TABLET, FILM COATED ORAL at 20:47

## 2021-05-22 ASSESSMENT — PAIN SCALES - GENERAL: PAINLEVEL_OUTOF10: 0

## 2021-05-22 NOTE — PROGRESS NOTES
Reason for follow up: Chest tightness and severe ostial circumflex stenosis. Subjective: Patient complains of chronic chest tightness. Objective:    No distress. No events overnight. Scheduled Meds:   sodium chloride flush  5-40 mL Intravenous 2 times per day    tamsulosin  0.4 mg Oral Daily    [Held by provider] apixaban  5 mg Oral BID    pantoprazole  40 mg Oral QAM AC    FLUoxetine  40 mg Oral Daily    rosuvastatin  40 mg Oral Nightly    aspirin  81 mg Oral Daily       Continuous Infusions:   sodium chloride             Intake/Output Summary (Last 24 hours) at 5/22/2021 1358  Last data filed at 5/22/2021 0754  Gross per 24 hour   Intake 360 ml   Output 300 ml   Net 60 ml       Patient Vitals for the past 96 hrs (Last 3 readings):   Weight   05/20/21 1715 220 lb (99.8 kg)   05/20/21 0812 220 lb (99.8 kg)          PE:   /71   Pulse (!) 41   Temp 98.1 °F (36.7 °C) (Temporal)   Resp 16   Ht 5' 7\" (1.702 m)   Wt 220 lb (99.8 kg)   SpO2 96%   BMI 34.46 kg/m²   CONST: Middle-age obese male who appears of stated age. Awake, alert, cooperative, no apparent distress. HEENT: Head- normocephalic, atraumatic. Neck: no jugular venous distention. No carotid bruit noted. .   LUNGS: Clear. CARDIOVASCULAR: Regular and bradycardic, no murmur, s3, s4 or rub noted. PV: No lower extremity edema. Pedal pulses palpable, no clubbing or cyanosis   ABDOMEN: Soft, non-tender to light palpation. Bowel sounds present. No palpable masses no hepatosplenomegaly or splenomegaly; no abdominal bruit / pulsation  SKIN: Warm and dry. NEURO / PSYCH: Oriented to person, place and time. Speech clear and appropriate. Follows all commands. Pleasant affect.        Monitor: Sinus bradycardia at 47 bpm.      Lab Review       Recent Labs     05/20/21  0903 05/21/21  1030   WBC 5.1 7.0   HGB 13.6 14.4   HCT 39.9 45.6    207       Recent Labs     05/20/21  0903 05/21/21  1030    139   K 4.5 4.2    105   CO2 28 25   BUN 17 12   CREATININE 0.8 0.7       Recent Labs     05/21/21  1030   AST 22   ALT 19   ALKPHOS 56         Last 3 Troponin:    Lab Results   Component Value Date    TROPONINI <0.01 05/20/2021    TROPONINI <0.01 05/20/2021    TROPONINI <0.01 05/20/2021             Recent Labs     05/20/21  0903   PROBNP 56             Assessment:  -CAD with severe ostial left circumflex stenosis and patent stent in proximal LAD and distal RCA. -Chronic sinus bradycardia.  -History of PE in 2019.  -Obstructive sleep apnea treated with CPAP at night.  -Obesity. -GERD. -History of prostate cancer. Plan:  -Continue aspirin and Crestor.  -Start Lovenox 1 mg/kg subcutaneously twice daily.  -Check TSH. -Use CPAP at night to treat obstructive sleep apnea  -CABG on Tuesday as per patient discussion with . -Might need permanent pacemaker placement if no improvement of heart rate with revascularization of circumflex artery. Electronically signed by Armond Jc MD on 5/22/2021 at 1:58 PM  [facility].

## 2021-05-22 NOTE — CONSULTS
CTS Consult    Patient name: Robinson Antonio    Reason for consult:  MVCAD     Primary Care Physician: ANSELMO Alvarado CNP    Date of service: 5/22/2021    Chief Complaint:   Chest pain     HPI:  61 yo male with hx of CAD and multiple stents in the past who presented with complaints of increasing chest pain for approximately a week. Pt states he had be having exertional chest pain at home which progressed to resting chest pain and worsened when ambulatory and accompanied by lightheadedness. He underwent workup that included LHC. This revealed ostial cx disease.     Allergies: No Known Allergies    Home medications:    Current Facility-Administered Medications   Medication Dose Route Frequency Provider Last Rate Last Admin    acetaminophen (TYLENOL) tablet 650 mg  650 mg Oral Q4H PRN Patricia Carter DO        nitroGLYCERIN (NITROSTAT) SL tablet 0.4 mg  0.4 mg Sublingual Q5 Min PRN Rolan Collins MD   0.4 mg at 05/20/21 1914    sodium chloride flush 0.9 % injection 5-40 mL  5-40 mL Intravenous 2 times per day ANSELMO Chou CNP   10 mL at 05/21/21 0816    sodium chloride flush 0.9 % injection 5-40 mL  5-40 mL Intravenous PRN ANSELMO Chou CNP        0.9 % sodium chloride infusion  25 mL Intravenous PRN ANSELMO Chou CNP        promethazine (PHENERGAN) tablet 12.5 mg  12.5 mg Oral Q6H PRN ANSELMO Chou CNP        Or    ondansetron (ZOFRAN) injection 4 mg  4 mg Intravenous Q6H PRN ANSELMO Chou CNP        polyethylene glycol (GLYCOLAX) packet 17 g  17 g Oral Daily PRN ANSELMO Chou CNP        acetaminophen (TYLENOL) suppository 650 mg  650 mg Rectal Q6H PRN ANSELMO Chou CNP        tamsulosin (FLOMAX) capsule 0.4 mg  0.4 mg Oral Daily ANSELMO Chou CNP   0.4 mg at 05/22/21 0857    [Held by provider] apixaban (ELIQUIS) tablet 5 mg  5 mg Oral BID ANSELMO Chou CNP   5 mg at 05/20/21 2048    pantoprazole (PROTONIX) tablet 40 mg  40 mg Oral QAM AC Rose Bolds Dwain, APRN - CNP   40 mg at 05/22/21 0550    FLUoxetine (PROZAC) capsule 40 mg  40 mg Oral Daily Alatna Rump, APRN - CNP   40 mg at 05/22/21 0857    rosuvastatin (CRESTOR) tablet 40 mg  40 mg Oral Nightly Alatna Rump, APRN - CNP   40 mg at 05/20/21 2048    hydrOXYzine (VISTARIL) injection 25 mg  25 mg Intramuscular Q6H PRN Alatna Rump, APRN - CNP        aspirin chewable tablet 81 mg  81 mg Oral Daily Alatna Rump, APRN - CNP   81 mg at 05/22/21 9011       Past Medical History:  Past Medical History:   Diagnosis Date    CAD (coronary artery disease)     12/20/17 alpine 3.0x28 loraine mid-distal rca    Cancer [facility])     prostate    Gastroesophageal reflux disease     Heart problem     has a blockage    Hyperlipidemia     Prostate cancer ([facility] [address] 10/26/2015       Past Surgical History:  Past Surgical History:   Procedure Laterality Date    CARDIAC SURGERY      Stent-5/2013    CORONARY ANGIOPLASTY WITH STENT PLACEMENT  12/05/2017    Dr. Shelly Douglas - 3.5x 23 Xience Alpine LORAINE to the Prox LAD.     PROSTATE SURGERY      radioactive seeds implanted       Social History:  Social History     Socioeconomic History    Marital status:      Spouse name: Not on file    Number of children: Not on file    Years of education: Not on file    Highest education level: Not on file   Occupational History    Not on file   Tobacco Use    Smoking status: Never Smoker    Smokeless tobacco: Never Used   Vaping Use    Vaping Use: Never used   Substance and Sexual Activity    Alcohol use: Yes     Comment: Occassionally     Drug use: No    Sexual activity: Not on file   Other Topics Concern    Not on file   Social History Narrative    Not on file     Social Determinants of Health     Financial Resource Strain:     Difficulty of Paying Living Expenses:    Food Insecurity:     Worried About Running Out of Food in the Last Year:     [address] in the Last Year:    Transportation Needs:     Lack of Transportation (Medical):  Lack of Transportation (Non-Medical):    Physical Activity:     Days of Exercise per Week:     Minutes of Exercise per Session:    Stress:     Feeling of Stress :    Social Connections:     Frequency of Communication with Friends and Family:     Frequency of Social Gatherings with Friends and Family:     Attends [employer] Services:     Active Member of Clubs or Organizations:     Attends Club or Organization Meetings:     Marital Status:    Intimate Partner Violence:     Fear of Current or Ex-Partner:     Emotionally Abused:     Physically Abused:     Sexually Abused:        Family History:  Family History   Problem Relation Age of Onset    High Blood Pressure Mother     Heart Disease Father     No Known Problems Sister     No Known Problems Brother     No Known Problems Sister        Review of Systems:  Constitutional: Denies fevers, chills, or weight loss. HEENT: Denies visual changes or hearing loss. Heart: As per HPI. Lungs: Denies shortness of breath, cough, or wheezing. Gastrointestinal: Denies nausea, vomiting, constipation, or diarrhea. Genitourinary: dysuria or hematuria. Psychiatric: Patient denies anxiety or depression. Neurologic: Patient denies weakness of the extremities, dizziness, or headaches. All other ROS checked and found to be negative. Labs:  Recent Labs     05/20/21  0903 05/21/21  1030   WBC 5.1 7.0   HGB 13.6 14.4   HCT 39.9 45.6    207      Recent Labs     05/20/21  0903 05/21/21  1030   BUN 17 12   CREATININE 0.8 0.7       Objective:  Vitals /71   Pulse (!) 41   Temp 98.1 °F (36.7 °C) (Temporal)   Resp 16   Ht 5' 7\" (1.702 m)   Wt 220 lb (99.8 kg)   SpO2 96%   BMI 34.46 kg/m²   General Appearance: Pleasant 62y.o. year old male who appears stated age. Communicates well, no acute distress. HEENT: Head is normocephalic, atraumatic. EOMs intact, PERRL. Trachea midline. Lungs: Normal respiratory rate and normal effort. He is not in respiratory distress. Breath sounds clear to auscultation. No wheezes. Heart: Normal rate. Regular rhythm. S1 normal and S2 normal. Positive for murmur. Chest: Symmetric chest wall expansion. Extremities: Normal range of motion. Neurological: Patient is alert and oriented to person, place and time. Patient has normal reflexes. Skin: Warm and dry. Abdomen: Abdomen is soft and non-distended. Bowel sounds are normal. There is no abdominal tenderness tenderness. There is no guarding. There is no mass. Pulses: Distal pulses are intact. Skin: Warm and dry without lesions. Angiographic Results/findings:  Left main: No angiographically significant stenosis. LAD: Aneurysmal formation in the very proximal LAD. Proximal stent is widely patent. Wraparound vessel supplying the distal inferior wall. D1: Tiny vessel. D2: No angiographically significant stenosis. Circumflex: Ostial 75 to 80% stenosis extending to the left main. No landing zone. OM1: No angiographically significant stenosis. Right coronary artery: Diffuse luminal irregularities. Distal stent demonstrates the midportion to be on a bend with a 20 to 30% in-stent restenosis. PDA: Small vessel. Assessment/Plan    61 yo male with single vessel disease (ostial cx)  Surgery was discussed. Need to talk to cardiology regarding feasibility of PCI.         Electronically signed by Delbert Ordonez DO on 5/22/2021 at 10:32 AM

## 2021-05-22 NOTE — PROGRESS NOTES
Hospitalist Progress Note      Synopsis: Patient admitted on 5/20/2021 for chest pain. Stress test positive. Cardiac cath done; pt planned for CABG on Tuesday per his discussion with CT surgery. May need pacemaker according to cardiology if HR does not improve with revascularization. Subjective    Clinically improving. Feeling better. Stable overnight. No other overnight issues reported. No CP, SOB, palpitations, blurred vision, HA, lightheadedness, LOC or focal neurological deficits    Exam:  /70   Pulse (!) 46   Temp 98.4 °F (36.9 °C) (Temporal)   Resp 16   Ht 5' 7\" (1.702 m)   Wt 220 lb (99.8 kg)   SpO2 96%   BMI 34.46 kg/m²   General appearance: No apparent distress, appears stated age and cooperative. HEENT: Pupils equal, round, and reactive to light. Conjunctivae/corneas clear. Neck: Supple. No jugular venous distention. Trachea midline. Respiratory:  Normal respiratory effort. Clear to auscultation, bilaterally without Rales/Wheezes/Rhonchi. Cardiovascular: Regular rate and rhythm with normal S1/S2 without murmurs, rubs or gallops. Abdomen: Soft, non-tender, non-distended with normal bowel sounds. Musculoskeletal: No clubbing, cyanosis or edema bilaterally. Brisk capillary refill. 2+ lower extremity pulses (dorsalis pedis).    Skin:  No rashes    Neurologic: awake, alert and following commands     Medications:  Reviewed    Infusion Medications    sodium chloride       Scheduled Medications    enoxaparin  1 mg/kg Subcutaneous BID    sodium chloride flush  5-40 mL Intravenous 2 times per day    tamsulosin  0.4 mg Oral Daily    [Held by provider] apixaban  5 mg Oral BID    pantoprazole  40 mg Oral QAM AC    FLUoxetine  40 mg Oral Daily    rosuvastatin  40 mg Oral Nightly    aspirin  81 mg Oral Daily     PRN Meds: acetaminophen, nitroGLYCERIN, sodium chloride flush, sodium chloride, promethazine **OR** ondansetron, polyethylene glycol, [DISCONTINUED] acetaminophen **OR** acetaminophen, hydrOXYzine    I/O    Intake/Output Summary (Last 24 hours) at 5/22/2021 1727  Last data filed at 5/22/2021 1551  Gross per 24 hour   Intake 840 ml   Output 600 ml   Net 240 ml       Labs:   Recent Labs     05/20/21  0903 05/21/21  1030   WBC 5.1 7.0   HGB 13.6 14.4   HCT 39.9 45.6    207       Recent Labs     05/20/21  0903 05/21/21  1030    139   K 4.5 4.2    105   CO2 28 25   BUN 17 12   CREATININE 0.8 0.7   CALCIUM 9.1 8.8       Recent Labs     05/20/21  0903 05/21/21  1030   PROT 6.7 6.8   ALKPHOS 53 56   ALT 24 19   AST 24 22   BILITOT 0.5 0.6       No results for input(s): INR in the last 72 hours. Recent Labs     05/20/21  0903 05/20/21  1258 05/20/21  1708   TROPONINI <0.01 <0.01 <0.01       Chronic labs:  Lab Results   Component Value Date    CHOL 138 10/27/2015    TRIG 70 10/27/2015    HDL 47 12/04/2017    LDLCALC 94 12/04/2017    TSH 1.920 12/03/2017    INR 1.1 12/20/2017       Radiology:  Imaging studies reviewed today. ASSESSMENT:  Chest pain with abnormal stress test  Hx of CAD  Sinus bradycardia  HLD  Hx of PE on eliquis  GERD  Prostate cancer  Anxiety and depression      PLAN:  Appreciate cardiology input; for cardiac cath today  Strict I/O, monitor renal function  Continue home meds as ordered  EKG prn chest pain  CT surgery consulted and potential CABG on Tuesday vs PCI - CT surgery discussing with cardiology     Discussed with pt at wife at bedside on 5/21/21       Diet: DIET CARDIAC;  Code Status: Full Code  PT/OT Eval Status:   As needed  DVT Prophylaxis:   eliquis  Recommended disposition at discharge:  home at [place]     +++++++++++++++++++++++++++++++++++++++++++++++++  Sloan De La Cruz MD   [facility].  +++++++++++++++++++++++++++++++++++++++++++++++++  NOTE: This report was transcribed using voice recognition software. Every effort was made to ensure accuracy; however, inadvertent computerized transcription errors may be present.

## 2021-05-23 LAB — TSH SERPL DL<=0.05 MIU/L-ACNC: 2.75 UIU/ML (ref 0.27–4.2)

## 2021-05-23 PROCEDURE — 6360000002 HC RX W HCPCS: Performed by: INTERNAL MEDICINE

## 2021-05-23 PROCEDURE — 36415 COLL VENOUS BLD VENIPUNCTURE: CPT

## 2021-05-23 PROCEDURE — 2140000000 HC CCU INTERMEDIATE R&B

## 2021-05-23 PROCEDURE — 6370000000 HC RX 637 (ALT 250 FOR IP): Performed by: INTERNAL MEDICINE

## 2021-05-23 PROCEDURE — 99233 SBSQ HOSP IP/OBS HIGH 50: CPT | Performed by: INTERNAL MEDICINE

## 2021-05-23 PROCEDURE — 84443 ASSAY THYROID STIM HORMONE: CPT

## 2021-05-23 PROCEDURE — 2580000003 HC RX 258: Performed by: INTERNAL MEDICINE

## 2021-05-23 RX ADMIN — TAMSULOSIN HYDROCHLORIDE 0.4 MG: 0.4 CAPSULE ORAL at 09:16

## 2021-05-23 RX ADMIN — ROSUVASTATIN 40 MG: 20 TABLET, FILM COATED ORAL at 20:03

## 2021-05-23 RX ADMIN — SODIUM CHLORIDE, PRESERVATIVE FREE 10 ML: 5 INJECTION INTRAVENOUS at 20:03

## 2021-05-23 RX ADMIN — SODIUM CHLORIDE, PRESERVATIVE FREE 10 ML: 5 INJECTION INTRAVENOUS at 09:16

## 2021-05-23 RX ADMIN — ENOXAPARIN SODIUM 100 MG: 100 INJECTION SUBCUTANEOUS at 09:15

## 2021-05-23 RX ADMIN — PANTOPRAZOLE SODIUM 40 MG: 40 TABLET, DELAYED RELEASE ORAL at 06:10

## 2021-05-23 RX ADMIN — HYDROXYZINE PAMOATE 25 MG: 25 CAPSULE ORAL at 09:16

## 2021-05-23 RX ADMIN — FLUOXETINE HYDROCHLORIDE 40 MG: 20 CAPSULE ORAL at 09:15

## 2021-05-23 RX ADMIN — ASPIRIN 81 MG CHEWABLE TABLET 81 MG: 81 TABLET CHEWABLE at 09:15

## 2021-05-23 ASSESSMENT — PAIN SCALES - GENERAL
PAINLEVEL_OUTOF10: 0

## 2021-05-23 NOTE — PROGRESS NOTES
Occasional chest discomfort   Reviewed films and discussed with patient - appears to me to have only single vessel disease in the ostial circ which is not a strong indication for surgery   Will discuss with cardiology this week regarding PCI option

## 2021-05-23 NOTE — PROGRESS NOTES
Hospitalist Progress Note      Synopsis: Patient admitted on 5/20/2021 for chest pain. Stress test positive. Cardiac cath done; CT surgery and cardiology discussing whether PCI or CABG is best option for pt. May need pacemaker according to cardiology if HR does not improve with revascularization. Subjective    Continues to be bradycardic    Exam:  /79   Pulse (!) 38   Temp 97 °F (36.1 °C) (Temporal)   Resp 16   Ht 5' 7\" (1.702 m)   Wt 224 lb 12.8 oz (102 kg)   SpO2 96%   BMI 35.21 kg/m²   General appearance: No apparent distress, appears stated age and cooperative. HEENT: Pupils equal, round, and reactive to light. Conjunctivae/corneas clear. Neck: Supple. No jugular venous distention. Trachea midline. Respiratory:  Normal respiratory effort. Clear to auscultation, bilaterally without Rales/Wheezes/Rhonchi. Cardiovascular: Bradycardic  Abdomen: Soft, non-tender, non-distended with normal bowel sounds. Musculoskeletal: No clubbing, cyanosis or edema bilaterally. Brisk capillary refill. 2+ lower extremity pulses (dorsalis pedis).    Skin:  No rashes    Neurologic: awake, alert and following commands     Medications:  Reviewed    Infusion Medications    sodium chloride       Scheduled Medications    hydrOXYzine  25 mg Oral Daily    enoxaparin  1 mg/kg Subcutaneous BID    sodium chloride flush  5-40 mL Intravenous 2 times per day    tamsulosin  0.4 mg Oral Daily    [Held by provider] apixaban  5 mg Oral BID    pantoprazole  40 mg Oral QAM AC    FLUoxetine  40 mg Oral Daily    rosuvastatin  40 mg Oral Nightly    aspirin  81 mg Oral Daily     PRN Meds: acetaminophen, nitroGLYCERIN, sodium chloride flush, sodium chloride, promethazine **OR** ondansetron, polyethylene glycol, [DISCONTINUED] acetaminophen **OR** acetaminophen, hydrOXYzine    I/O    Intake/Output Summary (Last 24 hours) at 5/23/2021 0835  Last data filed at 5/22/2021 1551  Gross per 24 hour   Intake 480 ml   Output 300 ml Net 180 ml       Labs:   Recent Labs     05/20/21  0903 05/21/21  1030   WBC 5.1 7.0   HGB 13.6 14.4   HCT 39.9 45.6    207       Recent Labs     05/20/21  0903 05/21/21  1030    139   K 4.5 4.2    105   CO2 28 25   BUN 17 12   CREATININE 0.8 0.7   CALCIUM 9.1 8.8       Recent Labs     05/20/21  0903 05/21/21  1030   PROT 6.7 6.8   ALKPHOS 53 56   ALT 24 19   AST 24 22   BILITOT 0.5 0.6       No results for input(s): INR in the last 72 hours. Recent Labs     05/20/21  0903 05/20/21  1258 05/20/21  1708   TROPONINI <0.01 <0.01 <0.01       Chronic labs:  Lab Results   Component Value Date    CHOL 138 10/27/2015    TRIG 70 10/27/2015    HDL 47 12/04/2017    LDLCALC 94 12/04/2017    TSH 2.750 05/23/2021    INR 1.1 12/20/2017       Radiology:  Imaging studies reviewed today. ASSESSMENT:  Chest pain with abnormal stress test  Hx of CAD  Sinus bradycardia  HLD  Hx of PE on eliquis  GERD  Prostate cancer  Anxiety and depression      PLAN:  Appreciate cardiology input; for cardiac cath today  Strict I/O, monitor renal function  Continue home meds as ordered  EKG prn chest pain  CT surgery consulted and potential CABG on Tuesday vs PCI - CT surgery discussing with cardiology     Discussed with pt at wife at bedside on 5/21/21       Diet: DIET CARDIAC;  Code Status: Full Code  PT/OT Eval Status:   As needed  DVT Prophylaxis:   eliquis  Recommended disposition at discharge:  home at [place]     +++++++++++++++++++++++++++++++++++++++++++++++++  Veena Sinha MD   [facility].  +++++++++++++++++++++++++++++++++++++++++++++++++  NOTE: This report was transcribed using voice recognition software. Every effort was made to ensure accuracy; however, inadvertent computerized transcription errors may be present.

## 2021-05-23 NOTE — PLAN OF CARE
Problem: Pain:  Goal: Pain level will decrease  Description: Pain level will decrease  5/23/2021 0123 by Sarah Beth Patel RN  Outcome: Ongoing     Problem: Pain:  Goal: Control of acute pain  Description: Control of acute pain  5/23/2021 0123 by Sarah Beth Patel RN  Outcome: Ongoing  5/22/2021 2124 by Sarah Beth Patel RN  Outcome: Met This Shift

## 2021-05-23 NOTE — PROGRESS NOTES
Pharmacy Note    Gael Wang was ordered CoQ10 capsules. As per the [facility], herbals and certain dietary supplements will be discontinued.   The herbal or dietary supplement may be continued after discharge from the hospital.    Eduarda Birmingham, PharmD  05/22/21 8:11 PM

## 2021-05-23 NOTE — PROGRESS NOTES
HGB 14.4   HCT 45.6          Recent Labs     05/21/21  1030      K 4.2      CO2 25   BUN 12   CREATININE 0.7       Recent Labs     05/21/21  1030   AST 22   ALT 19   ALKPHOS 56         Last 3 Troponin:    Lab Results   Component Value Date    TROPONINI <0.01 05/20/2021    TROPONINI <0.01 05/20/2021    TROPONINI <0.01 05/20/2021             Assessment:  -CAD with severe ostial left circumflex stenosis and patent stent in proximal LAD and distal RCA. -Chronic sinus bradycardia. His TSH is normal.  His sinus bradycardia is probably due to his underlying CAD or sinus node dysfunction.  -History of PE in 2019.  -Obstructive sleep apnea treated with CPAP at night.  -Obesity. -GERD. -History of prostate cancer. Plan:  -Continue current cardiac medications.  -Hold Lovenox after tonight dose.  -N.p.o. after midnight. -PCI of the ostial circumflex in a.m.  -Might need a permanent pacemaker placement if no improvement of his heart rate after revascularization of his circumflex artery. Electronically signed by Louie Shahid MD on 5/23/2021 at [address] [place] [address] Cardiology.

## 2021-05-24 LAB
EKG ATRIAL RATE: 41 BPM
EKG P AXIS: 27 DEGREES
EKG P-R INTERVAL: 188 MS
EKG Q-T INTERVAL: 496 MS
EKG QRS DURATION: 96 MS
EKG QTC CALCULATION (BAZETT): 409 MS
EKG R AXIS: -37 DEGREES
EKG T AXIS: 12 DEGREES
EKG VENTRICULAR RATE: 41 BPM
POC ACT LR: 159 SECONDS
POC ACT LR: 184 SECONDS
POC ACT LR: 282 SECONDS

## 2021-05-24 PROCEDURE — 93005 ELECTROCARDIOGRAM TRACING: CPT | Performed by: INTERNAL MEDICINE

## 2021-05-24 PROCEDURE — 92928 PRQ TCAT PLMT NTRAC ST 1 LES: CPT | Performed by: INTERNAL MEDICINE

## 2021-05-24 PROCEDURE — C1874 STENT, COATED/COV W/DEL SYS: HCPCS

## 2021-05-24 PROCEDURE — 93454 CORONARY ARTERY ANGIO S&I: CPT | Performed by: INTERNAL MEDICINE

## 2021-05-24 PROCEDURE — 93010 ELECTROCARDIOGRAM REPORT: CPT | Performed by: INTERNAL MEDICINE

## 2021-05-24 PROCEDURE — 6370000000 HC RX 637 (ALT 250 FOR IP)

## 2021-05-24 PROCEDURE — 6360000002 HC RX W HCPCS

## 2021-05-24 PROCEDURE — C1769 GUIDE WIRE: HCPCS

## 2021-05-24 PROCEDURE — 6370000000 HC RX 637 (ALT 250 FOR IP): Performed by: INTERNAL MEDICINE

## 2021-05-24 PROCEDURE — 027034Z DILATION OF CORONARY ARTERY, ONE ARTERY WITH DRUG-ELUTING INTRALUMINAL DEVICE, PERCUTANEOUS APPROACH: ICD-10-PCS | Performed by: INTERNAL MEDICINE

## 2021-05-24 PROCEDURE — 2580000003 HC RX 258: Performed by: INTERNAL MEDICINE

## 2021-05-24 PROCEDURE — 2709999900 HC NON-CHARGEABLE SUPPLY

## 2021-05-24 PROCEDURE — C1725 CATH, TRANSLUMIN NON-LASER: HCPCS

## 2021-05-24 PROCEDURE — C1887 CATHETER, GUIDING: HCPCS

## 2021-05-24 PROCEDURE — 85347 COAGULATION TIME ACTIVATED: CPT

## 2021-05-24 PROCEDURE — 99233 SBSQ HOSP IP/OBS HIGH 50: CPT | Performed by: INTERNAL MEDICINE

## 2021-05-24 PROCEDURE — C1894 INTRO/SHEATH, NON-LASER: HCPCS

## 2021-05-24 PROCEDURE — 2140000000 HC CCU INTERMEDIATE R&B

## 2021-05-24 PROCEDURE — 92920 PRQ TRLUML C ANGIOP 1ART&/BR: CPT | Performed by: INTERNAL MEDICINE

## 2021-05-24 PROCEDURE — 2500000003 HC RX 250 WO HCPCS

## 2021-05-24 RX ORDER — SODIUM CHLORIDE 0.9 % (FLUSH) 0.9 %
5-40 SYRINGE (ML) INJECTION PRN
Status: DISCONTINUED | OUTPATIENT
Start: 2021-05-24 | End: 2021-05-25 | Stop reason: HOSPADM

## 2021-05-24 RX ORDER — SODIUM CHLORIDE 9 MG/ML
INJECTION, SOLUTION INTRAVENOUS CONTINUOUS
Status: DISCONTINUED | OUTPATIENT
Start: 2021-05-24 | End: 2021-05-25 | Stop reason: HOSPADM

## 2021-05-24 RX ORDER — SODIUM CHLORIDE 9 MG/ML
25 INJECTION, SOLUTION INTRAVENOUS PRN
Status: DISCONTINUED | OUTPATIENT
Start: 2021-05-24 | End: 2021-05-25 | Stop reason: HOSPADM

## 2021-05-24 RX ORDER — PRASUGREL 10 MG/1
10 TABLET, FILM COATED ORAL DAILY
Status: DISCONTINUED | OUTPATIENT
Start: 2021-05-25 | End: 2021-05-25 | Stop reason: HOSPADM

## 2021-05-24 RX ORDER — SODIUM CHLORIDE 0.9 % (FLUSH) 0.9 %
5-40 SYRINGE (ML) INJECTION EVERY 12 HOURS SCHEDULED
Status: DISCONTINUED | OUTPATIENT
Start: 2021-05-24 | End: 2021-05-25 | Stop reason: HOSPADM

## 2021-05-24 RX ADMIN — HYDROXYZINE PAMOATE 25 MG: 25 CAPSULE ORAL at 08:37

## 2021-05-24 RX ADMIN — APIXABAN 5 MG: 5 TABLET, FILM COATED ORAL at 20:54

## 2021-05-24 RX ADMIN — FLUOXETINE HYDROCHLORIDE 40 MG: 20 CAPSULE ORAL at 08:37

## 2021-05-24 RX ADMIN — SODIUM CHLORIDE: 9 INJECTION, SOLUTION INTRAVENOUS at 14:13

## 2021-05-24 RX ADMIN — ASPIRIN 81 MG CHEWABLE TABLET 81 MG: 81 TABLET CHEWABLE at 08:36

## 2021-05-24 RX ADMIN — SODIUM CHLORIDE, PRESERVATIVE FREE 10 ML: 5 INJECTION INTRAVENOUS at 20:56

## 2021-05-24 RX ADMIN — ROSUVASTATIN 40 MG: 20 TABLET, FILM COATED ORAL at 20:54

## 2021-05-24 RX ADMIN — TAMSULOSIN HYDROCHLORIDE 0.4 MG: 0.4 CAPSULE ORAL at 20:54

## 2021-05-24 RX ADMIN — SODIUM CHLORIDE, PRESERVATIVE FREE 10 ML: 5 INJECTION INTRAVENOUS at 08:37

## 2021-05-24 RX ADMIN — PANTOPRAZOLE SODIUM 40 MG: 40 TABLET, DELAYED RELEASE ORAL at 06:23

## 2021-05-24 RX ADMIN — SODIUM CHLORIDE, PRESERVATIVE FREE 10 ML: 5 INJECTION INTRAVENOUS at 20:54

## 2021-05-24 ASSESSMENT — PAIN SCALES - GENERAL
PAINLEVEL_OUTOF10: 0
PAINLEVEL_OUTOF10: 0

## 2021-05-24 NOTE — PROGRESS NOTES
Hospitalist Progress Note      Synopsis: Patient admitted on 5/20/2021 for chest pain. Stress test positive. Cardiac cath done; CT surgery and cardiology discussing whether PCI or CABG is best option for pt. May need pacemaker according to cardiology if HR does not improve with revascularization. Pt underwent successful PCI to ostial LCx on 5/24     Subjective    Continues to be bradycardic    Exam:  /67   Pulse (!) 41   Temp 97.2 °F (36.2 °C) (Temporal)   Resp 16   Ht 5' 7\" (1.702 m)   Wt 224 lb 12.8 oz (102 kg)   SpO2 98%   BMI 35.21 kg/m²   General appearance: No apparent distress, appears stated age and cooperative. HEENT: Pupils equal, round, and reactive to light. Conjunctivae/corneas clear. Neck: Supple. No jugular venous distention. Trachea midline. Respiratory:  Normal respiratory effort. Clear to auscultation, bilaterally without Rales/Wheezes/Rhonchi. Cardiovascular: Bradycardic  Abdomen: Soft, non-tender, non-distended with normal bowel sounds. Musculoskeletal: No clubbing, cyanosis or edema bilaterally. Brisk capillary refill. 2+ lower extremity pulses (dorsalis pedis).    Skin:  No rashes    Neurologic: awake, alert and following commands     Medications:  Reviewed    Infusion Medications    sodium chloride      sodium chloride 75 mL/hr at 05/24/21 1413    sodium chloride       Scheduled Medications    apixaban  5 mg Oral BID    sodium chloride flush  5-40 mL Intravenous 2 times per day    [START ON 5/25/2021] prasugrel  10 mg Oral Daily    hydrOXYzine  25 mg Oral Daily    sodium chloride flush  5-40 mL Intravenous 2 times per day    tamsulosin  0.4 mg Oral Daily    pantoprazole  40 mg Oral QAM AC    FLUoxetine  40 mg Oral Daily    rosuvastatin  40 mg Oral Nightly    aspirin  81 mg Oral Daily     PRN Meds: sodium chloride flush, sodium chloride, acetaminophen, nitroGLYCERIN, sodium chloride flush, sodium chloride, promethazine **OR** ondansetron, polyethylene glycol,

## 2021-05-24 NOTE — PROGRESS NOTES
Inpatient Cardiology Progress note     PATIENT IS BEING FOLLOWED FOR: GUILLERMO Larson is a 62 y.o. male Known to Dr. Melchor Lovett: complains of continuous chest tightness  OBJECTIVE: No apparent distress     ROS:  Consist: Denies fevers, chills or night sweats  Heart: Denies chest pain, palpitations, lightheadedness, dizziness or syncope  Lungs: Denies SOB, cough, wheezing, orthopnea or PND  GI: Denies abdominal pain, vomiting or diarrhea    PHYSICAL EXAM:   /66   Pulse (!) 44   Temp 96 °F (35.6 °C) (Temporal)   Resp 16   Ht 5' 7\" (1.702 m)   Wt 224 lb 12.8 oz (102 kg)   SpO2 95%   BMI 35.21 kg/m²    B/P Range last 24 hours: Systolic (01BIU), NKX:280 , Min:107 , ONH:484    Diastolic (02WZQ), ICS:42, Min:66, Max:77    CONST: Well developed, well nourished male  who appears of stated age. Awake, alert and cooperative. No apparent distress  HEENT:   Head- Normocephalic, atraumatic   Eyes- Conjunctivae pink, anicteric  Throat- Oral mucosa pink and moist  Neck-  No stridor, trachea midline, no jugular venous distention. No carotid bruit  CHEST: Chest symmetrical and non-tender to palpation. No accessory muscle use or intercostal retractions  RESPIRATORY:  Lung sounds - clear throughout fields   CARDIOVASCULAR:     Heart Inspection- shows no noted pulsations  Heart Palpation- no heaves or thrills; PMI is non-displaced   Heart Ausculation- Regular rate and rhythm, no murmur. No s3, s4 or rub   PV: No lower extremity edema. No varicosities. Pedal pulses palpable, no clubbing or cyanosis   ABDOMEN: Soft, non-tender to light palpation. Bowel sounds present. No palpable masses no organomegaly; no abdominal bruit  MS: Good muscle strength and tone. No atrophy or abnormal movements. : Deferred  SKIN: Warm and dry no statis dermatitis or ulcers   NEURO / PSYCH: Oriented to person, place and time. Speech clear and appropriate. Follows all commands.  Pleasant affect     No intake or output data in the 24 hours ending 05/24/21 1214    Weight:   Wt Readings from Last 3 Encounters:   05/22/21 224 lb 12.8 oz (102 kg)   12/12/19 217 lb 9.6 oz (98.7 kg)   09/25/19 219 lb (99.3 kg)     Current Inpatient Medications:   hydrOXYzine  25 mg Oral Daily    [Held by provider] enoxaparin  1 mg/kg Subcutaneous BID    sodium chloride flush  5-40 mL Intravenous 2 times per day    tamsulosin  0.4 mg Oral Daily    [Held by provider] apixaban  5 mg Oral BID    pantoprazole  40 mg Oral QAM AC    FLUoxetine  40 mg Oral Daily    rosuvastatin  40 mg Oral Nightly    aspirin  81 mg Oral Daily       IV Infusions (if any):   sodium chloride         DIAGNOSTIC/ LABORATORY DATA:  Labs:   TFT:   Lab Results   Component Value Date    TSH 2.750 05/23/2021    T4FREE 1.07 10/27/2015      FASTING LIPID PANEL:  Lab Results   Component Value Date    CHOL 138 10/27/2015    HDL 47 12/04/2017    LDLCALC 94 12/04/2017    TRIG 70 10/27/2015       CXR 5/20/21: No acute process. Telemetry: SB    12 lead EKG 5/20/21: SB with PAC's and old lateral MI      Cath 5/21/21 ( Dr. Jed Daly )  Hemodynamics:   Opening Aortic pressure: 696/98   LV systolic pressure: 943   LVEDP: 0   No significant gradient across the aortic valve. Angiographic Results/findings:   Left main: No angiographically significant stenosis. LAD: Aneurysmal formation in the very proximal LAD.  Proximal   stent is widely patent.  Wraparound vessel supplying the distal   inferior wall. D1: Tiny vessel. D2: No angiographically significant stenosis. Circumflex: Ostial 75 to 80% stenosis extending to the left main.    No landing zone. OM1: No angiographically significant stenosis. Right coronary artery: Diffuse luminal irregularities.  Distal   stent demonstrates the midportion to be on a bend with a 20 to   30% in-stent restenosis. PDA: Small vessel. PLB: Large vessel.  No angiographically significant stenosis. ASSESSMENT:   1.  CAD with severe ostial left circumflex stenosis and patent stent in proximal LAD and distal RCA. 2. Chronic sinus bradycardia. Normal TSH. 3. History of PE in 2019.  4. Obstructive sleep apnea treated with CPAP at night. 5. Obesity. 6. GERD. 7. History of prostate cancer. PLAN:  1. For PCI of Ostial LCx today. The risks, benefits and alternative therapies were discussed with the patient. He understood, and consented to proceed. 2. Further recommendations to follow.     Electronically signed by Pamella Shukla MD on 5/24/2021 at 12:14 PM

## 2021-05-24 NOTE — PROCEDURES
(x2).    TECHNIQUE:  The procedure was done through left femoral artery approach. The patient was given 5000 units of intravenous heparin after obtaining  access in the left femoral artery. He received another 1000 units of  intravenous heparin during the procedure to maintain the ACT at around  300. After checking an ACT after 5000 units of intravenous heparin was  282. The patient was given 60 mg of Effient to swallow at the beginning  of the procedure. Intravenous Versed and intravenous fentanyl were  given for sedation. The left coronary artery was selectively engaged with the 7-Cymro guide  catheter. An Extra Support exchange wire was then advanced into the  LAD. Another wire was advanced through the ostium of the left  circumflex and into the marginal branch. The ostial left circumflex  disease was then dilated with the 2.5 mm x 10 mm [place] cutting  balloon inflated to 10 atmospheres. This was followed by advancing a  3.5 x 22 mm [place] Resolute drug-eluting coronary stent to cover the  ostium of the left circumflex into the proximal left circumflex with its  proximal edge into the left main and this stent was deployed at 18  atmospheres. The wire in the LAD was pulled back and then was  redirected through the stent struts into the LAD and over which, a 2.0  mm x 12 mm balloon was advanced and was inflated in the ostium of the  LAD to 14 atmospheres. Finally, a 3.5 mm x 15 mm noncompliant balloon  was advanced to the ostium of the circumflex and another similar balloon  was advanced to the ostium of the LAD with both proximal edges of these  balloons into the left main artery and these balloons were inflated  simultaneously to 16 atmospheres and were deflated simultaneously. Contrast injection at the end of the procedure revealed very good  results without any significant residual stenosis with no evidence of  dissections or flaps and with JUAN RAMON-3 flow in the vessels.     The left femoral arterial sheath was securely sutured to the skin at the  end of the procedure. The patient tolerated the procedure well and left the cardiac  catheterization laboratory in stable condition. CONCLUSIONS:  1. Successful [place] cutting balloon angioplasty to the ostium of  the left circumflex with deployment of drug-eluting coronary stent to  cover the ostium of the left circumflex, the proximal circumflex, and  the distal left main artery with finalization of the procedure with  inflation of simultaneously of two 3.5 mm kissing balloons in the ostium  of the LAD and the ostium of the left circumflex with very good results.         Lora Riley MD    D: 05/24/2021 13:40:47       T: 05/24/2021 13:44:14     [email or web address]  Job#: [id number]     Doc#: [id number]    CC:

## 2021-05-24 NOTE — CARE COORDINATION
Pt for cath with PCI today. Possible pacer pending HR after revascularization. Plan remains home with no needs.

## 2021-05-24 NOTE — PLAN OF CARE
Problem: Pain:  Goal: Control of acute pain  Description: Control of acute pain  5/24/2021 1304 by Sergei Boss RN  Outcome: Met This Shift  5/24/2021 0316 by Denise Watson RN  Outcome: Met This Shift

## 2021-05-25 VITALS
HEART RATE: 49 BPM | SYSTOLIC BLOOD PRESSURE: 104 MMHG | TEMPERATURE: 97.2 F | DIASTOLIC BLOOD PRESSURE: 61 MMHG | HEIGHT: 67 IN | OXYGEN SATURATION: 98 % | RESPIRATION RATE: 18 BRPM | BODY MASS INDEX: 35.28 KG/M2 | WEIGHT: 224.8 LBS

## 2021-05-25 LAB
ANION GAP SERPL CALCULATED.3IONS-SCNC: 8 MMOL/L (ref 7–16)
BUN BLDV-MCNC: 19 MG/DL (ref 6–23)
CALCIUM SERPL-MCNC: 9 MG/DL (ref 8.6–10.2)
CHLORIDE BLD-SCNC: 102 MMOL/L (ref 98–107)
CHOLESTEROL, TOTAL: 131 MG/DL (ref 0–199)
CO2: 29 MMOL/L (ref 22–29)
CREAT SERPL-MCNC: 0.8 MG/DL (ref 0.7–1.2)
EKG ATRIAL RATE: 42 BPM
EKG P AXIS: 3 DEGREES
EKG P-R INTERVAL: 186 MS
EKG Q-T INTERVAL: 498 MS
EKG QRS DURATION: 96 MS
EKG QTC CALCULATION (BAZETT): 415 MS
EKG R AXIS: -35 DEGREES
EKG T AXIS: 8 DEGREES
EKG VENTRICULAR RATE: 42 BPM
GFR AFRICAN AMERICAN: >60
GFR NON-AFRICAN AMERICAN: >60 ML/MIN/1.73
GLUCOSE BLD-MCNC: 104 MG/DL (ref 74–99)
HDLC SERPL-MCNC: 44 MG/DL
LDL CHOLESTEROL CALCULATED: 69 MG/DL (ref 0–99)
POTASSIUM REFLEX MAGNESIUM: 4.7 MMOL/L (ref 3.5–5)
SODIUM BLD-SCNC: 139 MMOL/L (ref 132–146)
TRIGL SERPL-MCNC: 88 MG/DL (ref 0–149)
VLDLC SERPL CALC-MCNC: 18 MG/DL

## 2021-05-25 PROCEDURE — 2580000003 HC RX 258: Performed by: INTERNAL MEDICINE

## 2021-05-25 PROCEDURE — 99233 SBSQ HOSP IP/OBS HIGH 50: CPT | Performed by: INTERNAL MEDICINE

## 2021-05-25 PROCEDURE — 6370000000 HC RX 637 (ALT 250 FOR IP): Performed by: INTERNAL MEDICINE

## 2021-05-25 PROCEDURE — 93005 ELECTROCARDIOGRAM TRACING: CPT | Performed by: INTERNAL MEDICINE

## 2021-05-25 PROCEDURE — 36415 COLL VENOUS BLD VENIPUNCTURE: CPT

## 2021-05-25 PROCEDURE — 80048 BASIC METABOLIC PNL TOTAL CA: CPT

## 2021-05-25 PROCEDURE — 80061 LIPID PANEL: CPT

## 2021-05-25 PROCEDURE — 93010 ELECTROCARDIOGRAM REPORT: CPT | Performed by: INTERNAL MEDICINE

## 2021-05-25 RX ORDER — PRASUGREL 10 MG/1
10 TABLET, FILM COATED ORAL DAILY
Qty: 30 TABLET | Refills: 0 | Status: SHIPPED | OUTPATIENT
Start: 2021-05-25

## 2021-05-25 RX ORDER — NITROGLYCERIN 0.4 MG/1
TABLET SUBLINGUAL
Qty: 25 TABLET | Refills: 0 | Status: SHIPPED | OUTPATIENT
Start: 2021-05-25

## 2021-05-25 RX ADMIN — TAMSULOSIN HYDROCHLORIDE 0.4 MG: 0.4 CAPSULE ORAL at 10:02

## 2021-05-25 RX ADMIN — PRASUGREL 10 MG: 10 TABLET, FILM COATED ORAL at 10:02

## 2021-05-25 RX ADMIN — PANTOPRAZOLE SODIUM 40 MG: 40 TABLET, DELAYED RELEASE ORAL at 06:13

## 2021-05-25 RX ADMIN — ASPIRIN 81 MG CHEWABLE TABLET 81 MG: 81 TABLET CHEWABLE at 10:02

## 2021-05-25 RX ADMIN — SODIUM CHLORIDE, PRESERVATIVE FREE 10 ML: 5 INJECTION INTRAVENOUS at 10:06

## 2021-05-25 RX ADMIN — FLUOXETINE HYDROCHLORIDE 40 MG: 20 CAPSULE ORAL at 10:02

## 2021-05-25 ASSESSMENT — PAIN SCALES - GENERAL: PAINLEVEL_OUTOF10: 0

## 2021-05-25 NOTE — CONSULTS
Met with patient and discussed that their physician has ordered a referral to our outpatient Phase II Cardiac Rehabilitation program. Reviewed the benefits of cardiac rehabilitation based on their diagnosis and personal risk factors. Patient demonstrates moderate interest in Cardiac Rehabilitation at this time. Cardiac Rehabilitation brochure provided to patient/family. The Cardiac Rehabilitation Program has been provided the patient's referral information and pertinent patient details and history. The patient may call [facility] at [phone] for additional information or questions. Contact information for [address] [place] [address] and other choices close to the patient's residence have been provided in the discharge instructions so that the patient may call and schedule an appointment when cleared by their physician.  Thank you for the referral.

## 2021-05-25 NOTE — DISCHARGE SUMMARY
Hospitalist Discharge Summary    Patient ID: Janet Lema   Patient : 1960  Patient's PCP: ANSELMO Tobar - CNP    Admit Date: 2021   Admitting Physician: Delfino Mejia MD    Discharge Date:  2021   Discharge Physician: Walt Lorenzo MD   Discharge Condition: Stable  Discharge Disposition: [facility] course in brief:  (Please refer to daily progress notes for a comprehensive review of the hospitalization by requesting medical records)    62y.o. year old male  who  has a past medical history of CAD (coronary artery disease), Cancer ([facility] [address] Gastroesophageal reflux disease, Heart problem, Hyperlipidemia, and Prostate cancer ([facility] [address] He presented to the ER on 21 with complaints of intermittent chest pain x 3 days. He reported that he has a history of CAD and has several episodes similar to this in the past. He has had a total of 4 heart caths, 1 [place] of state, 2 in 2017 here at [facility] and 1 in 2019 out of state. He states that he has three EMBER, that all intervention has been performed here and that he follows with Dr. Fei Castano. He describes his chest pain as pressure located mid sternal and radiating to his bilateral shoulders. His wife states that with each episode he becomes diaphoretic. EKG showed SB rate 51 bpm without ischemic changes. Labs were unremarkable, troponin <0.01. CXR showed no acute findings. He was given 324 mg ASA and sublingual NTG x 1. Cardiology has been consulted. He is to be admitted for observation. Hospital course  Cardiac cath on  with ostial circumflex disease deemed amenable to cardiothoracic surgery. Surgery was discussed and decision to have repeat PCI as it was only single-vessel disease and ostial circumflex with not a strong indication for surgery. Patient had a repeat PCI with angioplasty performed.     Discharge exam  /61   Pulse (!) 49   Temp 97.2 °F (36.2 °C) (Temporal)   Resp 18   Ht 5' 7\" (1.702 m)   Wt 224 lb WBCUA, BACTERIA, RBCUA, BLOODU, SPECGRAV, GLUCOSEU    Imaging:  XR CHEST PORTABLE    Result Date: 5/20/2021  EXAMINATION: ONE XRAY VIEW OF THE CHEST 5/20/2021 8:52 am COMPARISON: 12/03/2017 HISTORY: ORDERING SYSTEM PROVIDED HISTORY: chest pain TECHNOLOGIST PROVIDED HISTORY: Reason for exam:->chest pain What reading provider will be dictating this exam?->CRC FINDINGS: The lungs are without acute focal process. There is no effusion or pneumothorax. The cardiomediastinal silhouette is without acute process. The osseous structures are without acute process. No acute process. NM Cardiac Stress Test Nuclear Imaging    Result Date: 5/20/2021  Indication:  Chest pain and Clinical History:   Patient has historyof coronary artery disease, status post stents IMAGING: Myocardial perfusion imaging was performed at rest 30-35 minutes following the intravenous injection of 12 mCi of (Tc-Sestamibi) followed by 10 ml of Normal Saline. At peak exercise, the patient was injected intravenously with 35mCi of (Tc-Sestamibi) followed by 10 ml of Normal Saline. Gated post-stress tomographic imaging was performed 20-25 minutes after stress. FINDINGS: The overall quality of the study was admitted. Left ventricular cavity size was noted to be normal. Rotational analog analysis demonstrated no significant motion artifact The gated SPECT stress imaging in the short, vertical long, and horizontal long axis demonstrated a moderate defect was present in the mid anterior wall(s) that was smallsized by quantification. There also was a mild defect present in the inferior and inferolateral  walls that was small  sized by quantification. The resting images showed completely was below the anterior and inferolateral defects Gated SPECT left ventricular ejection fraction was calculated to be 65% with normal Myocardial wall motion.      The myocardial perfusion imaging was abnormal The abnormality was a small areas of reversible defect in

## 2021-05-25 NOTE — PROGRESS NOTES
Inpatient Cardiology Progress note     PATIENT IS BEING FOLLOWED FOR: CAD    Gunnar Hudson is a 62 y.o. male Known to Dr. Jef Sadler: Denies CP or SOB  OBJECTIVE: No apparent distress     ROS:  Consist: Denies fevers, chills or night sweats  Heart: Denies chest pain, palpitations, lightheadedness, dizziness or syncope  Lungs: Denies SOB, cough, wheezing, orthopnea or PND  GI: Denies abdominal pain, vomiting or diarrhea    PHYSICAL EXAM:   /74   Pulse (!) 42   Temp 97.5 °F (36.4 °C) (Temporal)   Resp 16   Ht 5' 7\" (1.702 m)   Wt 224 lb 12.8 oz (102 kg)   SpO2 96%   BMI 35.21 kg/m²    B/P Range last 24 hours: Systolic (75YMQ), NXA:811 , Min:112 , WTL:699    Diastolic (69DQW), OIO:40, Min:66, Max:74    CONST: Well developed, well nourished male  who appears of stated age. Awake, alert and cooperative. No apparent distress  HEENT:   Head- Normocephalic, atraumatic   Eyes- Conjunctivae pink, anicteric  Throat- Oral mucosa pink and moist  Neck-  No stridor, trachea midline, no jugular venous distention. No carotid bruit  CHEST: Chest symmetrical and non-tender to palpation. No accessory muscle use or intercostal retractions  RESPIRATORY:  Lung sounds - clear throughout fields   CARDIOVASCULAR:     Heart Inspection- shows no noted pulsations  Heart Palpation- no heaves or thrills; PMI is non-displaced   Heart Ausculation- Regular rate and rhythm, no murmur. No s3, s4 or rub   PV: No lower extremity edema. No varicosities. Pedal pulses palpable, no clubbing or cyanosis. Left femoral access site without hematoma and with good pulse  ABDOMEN: Soft, non-tender to light palpation. Bowel sounds present. No palpable masses no organomegaly; no abdominal bruit  MS: Good muscle strength and tone. No atrophy or abnormal movements. : Deferred  SKIN: Warm and dry no statis dermatitis or ulcers   NEURO / PSYCH: Oriented to person, place and time. Speech clear and appropriate. Follows all commands.  Pleasant affect       Intake/Output Summary (Last 24 hours) at 5/25/2021 0853  Last data filed at 5/25/2021 0811  Gross per 24 hour   Intake 720 ml   Output 925 ml   Net -205 ml       Weight:   Wt Readings from Last 3 Encounters:   05/22/21 224 lb 12.8 oz (102 kg)   12/12/19 217 lb 9.6 oz (98.7 kg)   09/25/19 219 lb (99.3 kg)     Current Inpatient Medications:   apixaban  5 mg Oral BID    sodium chloride flush  5-40 mL Intravenous 2 times per day    prasugrel  10 mg Oral Daily    hydrOXYzine  25 mg Oral Daily    sodium chloride flush  5-40 mL Intravenous 2 times per day    tamsulosin  0.4 mg Oral Daily    pantoprazole  40 mg Oral QAM AC    FLUoxetine  40 mg Oral Daily    rosuvastatin  40 mg Oral Nightly    aspirin  81 mg Oral Daily       IV Infusions (if any):   sodium chloride      sodium chloride Stopped (05/24/21 2044)    sodium chloride         DIAGNOSTIC/ LABORATORY DATA:  Labs:   TFT:   Lab Results   Component Value Date    TSH 2.750 05/23/2021    T4FREE 1.07 10/27/2015      FASTING LIPID PANEL:  Lab Results   Component Value Date    CHOL 138 10/27/2015    HDL 47 12/04/2017    LDLCALC 94 12/04/2017    TRIG 70 10/27/2015       CXR 5/20/21: No acute process. Telemetry: SB    12 lead EKG 5/25/21: SB. LAD. old lateral MI      Cath 5/21/21 ( Dr. Nury Alfaro )  Hemodynamics:   Opening Aortic pressure: 263/65   LV systolic pressure: 876   LVEDP: 0   No significant gradient across the aortic valve. Angiographic Results/findings:   Left main: No angiographically significant stenosis. LAD: Aneurysmal formation in the very proximal LAD.  Proximal   stent is widely patent.  Wraparound vessel supplying the distal   inferior wall. D1: Tiny vessel. D2: No angiographically significant stenosis. Circumflex: Ostial 75 to 80% stenosis extending to the left main.    No landing zone. OM1: No angiographically significant stenosis.    Right coronary artery: Diffuse luminal irregularities.  Distal   stent demonstrates the midportion to be on a bend with a 20 to   30% in-stent restenosis. PDA: Small vessel. PLB: Large vessel.  No angiographically significant stenosis. PCI ( Dr. Gonzalo Canada ) 5/24/21:  Findings:   Left main: 0% stenosis  LAD: 0% stenosis  Circumflex: 90% ostial stenosis.    Hemodynamics: Ao: 113/59 ( 79 )  Interventional procedure:   1. PCI vessel: LCx. Lesion type: C. JUAN RAMON III flow pre PCI. Angioplasty performed with 2.5 mm w[place] balloon balloon. Stenting performed with 3.5 mm EMBER Post dilated with two 3.5 mm non compliant kissing balloons in the LCX/ LM and  LAD/LM. Result: 0% residual stenosis and JUAN RAMON III distal flow.    Drugs: . Anticoagulation was maintained with IV Heparin . Effient 60 mg load given  in cath lab.      Left femoral sheath sutured to skin. There was good distal pulses in the LLE at the end of the procedure.     Complication: None   Blood loss: 10 cc  Contrast used: 50 cc    ASSESSMENT:   1. CAD with severe ostial left circumflex stenosis and patent stent in proximal LAD and distal RCA s/p Successful PCI to LCx 5/24/21 ( see above )  2. Chronic sinus bradycardia, reason why not on BB therapy. Normal TSH. 3. History of PE in 9/2019, still on Eliquis ? why  4. Obstructive sleep apnea treated with CPAP at night. 5. Obesity. 6. GERD. 7. History of prostate cancer. PLAN:  1. Discontinue Eliquis  2. Rest of medications same. Importance of compliance with DAPT over emphasized   3. OK for discharge from cardiology stand point. To call Dr. Tiffanie Diego office for outpatient FU  4. Cardiology will sign off.  Please call if needed    Electronically signed by Erin Riley MD on 5/25/2021 at 8:53 AM

## 2021-05-26 ENCOUNTER — TELEPHONE (OUTPATIENT)
Dept: ADMINISTRATIVE | Age: 61
End: 2021-05-26

## 2021-05-26 NOTE — TELEPHONE ENCOUNTER
Patient called to set up a hospital f/u with Dr. Ian Foote, he was discharged 5/25/21 and can be reached at [phone].

## 2021-07-26 ENCOUNTER — TELEPHONE (OUTPATIENT)
Dept: CARDIOLOGY CLINIC | Age: 61
End: 2021-07-26

## 2021-10-13 ENCOUNTER — HOSPITAL ENCOUNTER (OUTPATIENT)
Dept: HOSPITAL 83 - RAD | Age: 61
Discharge: HOME | End: 2021-10-13
Attending: NURSE PRACTITIONER
Payer: COMMERCIAL

## 2021-10-13 DIAGNOSIS — N20.0: Primary | ICD-10-CM

## 2021-10-13 DIAGNOSIS — M54.6: ICD-10-CM

## 2021-10-13 DIAGNOSIS — M47.816: ICD-10-CM

## 2021-12-13 ENCOUNTER — HOSPITAL ENCOUNTER (OUTPATIENT)
Dept: HOSPITAL 83 - RAD | Age: 61
Discharge: HOME | End: 2021-12-13
Attending: NURSE PRACTITIONER
Payer: COMMERCIAL

## 2021-12-13 DIAGNOSIS — R00.1: ICD-10-CM

## 2021-12-13 DIAGNOSIS — I25.10: Primary | ICD-10-CM

## 2021-12-13 DIAGNOSIS — I42.1: ICD-10-CM

## 2021-12-13 DIAGNOSIS — I44.7: ICD-10-CM

## 2022-05-11 PROBLEM — K21.9 GASTROESOPHAGEAL REFLUX DISEASE: Chronic | Status: ACTIVE | Noted: 2017-09-14

## 2022-09-20 ENCOUNTER — HOSPITAL ENCOUNTER (OUTPATIENT)
Dept: HOSPITAL 83 - RAD | Age: 62
Discharge: HOME | End: 2022-09-20
Attending: NURSE PRACTITIONER
Payer: COMMERCIAL

## 2022-09-20 DIAGNOSIS — N20.0: Primary | ICD-10-CM

## 2022-09-20 DIAGNOSIS — R30.0: ICD-10-CM

## 2022-09-30 ENCOUNTER — HOSPITAL ENCOUNTER (OUTPATIENT)
Dept: HOSPITAL 83 - LAB | Age: 62
Discharge: HOME | End: 2022-09-30
Attending: NURSE PRACTITIONER
Payer: COMMERCIAL

## 2022-09-30 DIAGNOSIS — I25.10: Primary | ICD-10-CM

## 2022-09-30 DIAGNOSIS — Z12.5: ICD-10-CM

## 2022-09-30 DIAGNOSIS — Z85.46: ICD-10-CM

## 2022-09-30 DIAGNOSIS — M54.50: ICD-10-CM

## 2022-09-30 DIAGNOSIS — R00.1: ICD-10-CM

## 2022-09-30 DIAGNOSIS — E78.00: ICD-10-CM

## 2022-09-30 LAB
ALP SERPL-CCNC: 58 U/L (ref 45–117)
ALT SERPL W P-5'-P-CCNC: 27 U/L (ref 12–78)
AST SERPL-CCNC: 18 IU/L (ref 3–35)
BASOPHILS # BLD AUTO: 0 10*3/UL (ref 0–0.1)
BASOPHILS NFR BLD AUTO: 0.7 % (ref 0–1)
BUN SERPL-MCNC: 16 MG/DL (ref 7–24)
CHLORIDE SERPL-SCNC: 106 MMOL/L (ref 98–107)
CHOLEST SERPL-MCNC: 135 MG/DL (ref ?–200)
CREAT SERPL-MCNC: 0.82 MG/DL (ref 0.7–1.3)
EOSINOPHIL # BLD AUTO: 0.4 10*3/UL (ref 0–0.4)
EOSINOPHIL # BLD AUTO: 6.7 % (ref 1–4)
ERYTHROCYTE [DISTWIDTH] IN BLOOD BY AUTOMATED COUNT: 12.6 % (ref 0–14.5)
HCT VFR BLD AUTO: 43.9 % (ref 42–52)
LDLC SERPL DIRECT ASSAY-MCNC: 64 MG/DL (ref 9–159)
LYMPHOCYTES # BLD AUTO: 1.6 10*3/UL (ref 1.3–4.4)
LYMPHOCYTES NFR BLD AUTO: 29.2 % (ref 27–41)
MCH RBC QN AUTO: 28.4 PG (ref 27–31)
MCHC RBC AUTO-ENTMCNC: 33 G/DL (ref 33–37)
MCV RBC AUTO: 86.1 FL (ref 80–94)
MONOCYTES # BLD AUTO: 0.4 10*3/UL (ref 0.1–1)
MONOCYTES NFR BLD MANUAL: 8.2 % (ref 3–9)
NEUT #: 3 10*3/UL (ref 2.3–7.9)
NEUT %: 55 % (ref 47–73)
NRBC BLD QL AUTO: 0 % (ref 0–0)
PLATELET # BLD AUTO: 251 10*3/UL (ref 130–400)
PMV BLD AUTO: 9.3 FL (ref 9.6–12.3)
POTASSIUM SERPL-SCNC: 4.1 MMOL/L (ref 3.5–5.1)
PROT SERPL-MCNC: 7.1 GM/DL (ref 6.4–8.2)
RBC # BLD AUTO: 5.1 10*6/UL (ref 4.5–5.9)
SODIUM SERPL-SCNC: 140 MMOL/L (ref 136–145)
TRIGL SERPL-MCNC: 59 MG/DL (ref ?–150)
WBC NRBC COR # BLD AUTO: 5.4 10*3/UL (ref 4.8–10.8)

## 2023-01-04 ENCOUNTER — HOSPITAL ENCOUNTER (OUTPATIENT)
Dept: HOSPITAL 83 - RAD | Age: 63
Discharge: HOME | End: 2023-01-04
Attending: UROLOGY
Payer: COMMERCIAL

## 2023-01-04 DIAGNOSIS — R10.2: Primary | ICD-10-CM

## 2023-03-19 ENCOUNTER — HOSPITAL ENCOUNTER (EMERGENCY)
Dept: HOSPITAL 83 - ED | Age: 63
Discharge: HOME | End: 2023-03-19
Payer: COMMERCIAL

## 2023-03-19 VITALS — BODY MASS INDEX: 29.62 KG/M2 | WEIGHT: 200 LBS | HEIGHT: 68.98 IN

## 2023-03-19 DIAGNOSIS — Z79.82: ICD-10-CM

## 2023-03-19 DIAGNOSIS — K21.9: Primary | ICD-10-CM

## 2023-03-19 DIAGNOSIS — Z79.899: ICD-10-CM

## 2023-03-19 LAB
ALP SERPL-CCNC: 66 U/L (ref 46–116)
ALT SERPL W P-5'-P-CCNC: 11 U/L (ref 10–49)
APTT PPP: 27.9 SECONDS (ref 20–32.1)
BACTERIA #/AREA URNS HPF: (no result) /[HPF]
BASOPHILS # BLD AUTO: 0.1 10*3/UL (ref 0–0.1)
BASOPHILS NFR BLD AUTO: 1 % (ref 0–1)
BUN SERPL-MCNC: 11 MG/DL (ref 9–23)
CHLORIDE SERPL-SCNC: 106 MMOL/L (ref 98–107)
EOSINOPHIL # BLD AUTO: 0.8 10*3/UL (ref 0–0.4)
EOSINOPHIL # BLD AUTO: 13 % (ref 1–4)
EPI CELLS #/AREA URNS HPF: (no result) /[HPF]
ERYTHROCYTE [DISTWIDTH] IN BLOOD BY AUTOMATED COUNT: 12.3 % (ref 0–14.5)
HCT VFR BLD AUTO: 42.6 % (ref 42–52)
INR BLD: 1.1 (ref 2–3.5)
LYMPHOCYTES # BLD AUTO: 1.6 10*3/UL (ref 1.3–4.4)
LYMPHOCYTES NFR BLD AUTO: 25.6 % (ref 27–41)
MCH RBC QN AUTO: 29 PG (ref 27–31)
MCHC RBC AUTO-ENTMCNC: 34.3 G/DL (ref 33–37)
MCV RBC AUTO: 84.5 FL (ref 80–94)
MONOCYTES # BLD AUTO: 0.4 10*3/UL (ref 0.1–1)
MONOCYTES NFR BLD MANUAL: 7.1 % (ref 3–9)
MUCOUS THREADS URNS QL MICRO: (no result)
NEUT #: 3.3 10*3/UL (ref 2.3–7.9)
NEUT %: 53.1 % (ref 47–73)
NRBC BLD QL AUTO: 0 % (ref 0–0)
PH UR STRIP: 7.5 [PH] (ref 4.5–8)
PLATELET # BLD AUTO: 212 10*3/UL (ref 130–400)
PMV BLD AUTO: 9.5 FL (ref 9.6–12.3)
POTASSIUM SERPL-SCNC: 4.2 MMOL/L (ref 3.4–5.1)
PROT SERPL-MCNC: 6.4 GM/DL (ref 6–8)
RBC # BLD AUTO: 5.04 10*6/UL (ref 4.5–5.9)
SP GR UR: 1.01 (ref 1–1.03)
UROBILINOGEN UR STRIP-MCNC: 0.2 E.U./DL (ref 0–1)
WBC #/AREA URNS HPF: (no result) WBC/HPF (ref 0–5)
WBC NRBC COR # BLD AUTO: 6.2 10*3/UL (ref 4.8–10.8)

## 2023-05-24 ENCOUNTER — HOSPITAL ENCOUNTER (OUTPATIENT)
Dept: HOSPITAL 83 - RAD | Age: 63
Discharge: HOME | End: 2023-05-24
Attending: NURSE PRACTITIONER
Payer: COMMERCIAL

## 2023-05-24 DIAGNOSIS — M43.8X5: ICD-10-CM

## 2023-05-24 DIAGNOSIS — M47.817: Primary | ICD-10-CM

## 2023-06-20 ENCOUNTER — HOSPITAL ENCOUNTER (OUTPATIENT)
Dept: HOSPITAL 83 - RAD | Age: 63
Discharge: HOME | End: 2023-06-20
Attending: NURSE PRACTITIONER
Payer: COMMERCIAL

## 2023-06-20 DIAGNOSIS — M79.605: ICD-10-CM

## 2023-06-20 DIAGNOSIS — R93.89: ICD-10-CM

## 2023-06-20 DIAGNOSIS — M43.8X4: Primary | ICD-10-CM

## 2023-07-12 ENCOUNTER — HOSPITAL ENCOUNTER (OUTPATIENT)
Dept: HOSPITAL 83 - MRI | Age: 63
Discharge: HOME | End: 2023-07-12
Attending: NURSE PRACTITIONER
Payer: COMMERCIAL

## 2023-07-12 DIAGNOSIS — M48.061: ICD-10-CM

## 2023-07-12 DIAGNOSIS — M25.78: ICD-10-CM

## 2023-07-12 DIAGNOSIS — M47.812: ICD-10-CM

## 2023-07-12 DIAGNOSIS — M47.816: Primary | ICD-10-CM

## 2023-08-24 ENCOUNTER — HOSPITAL ENCOUNTER (OUTPATIENT)
Dept: HOSPITAL 83 - LAB | Age: 63
Discharge: HOME | End: 2023-08-24
Attending: NURSE PRACTITIONER
Payer: COMMERCIAL

## 2023-08-24 DIAGNOSIS — R00.1: ICD-10-CM

## 2023-08-24 DIAGNOSIS — I25.10: Primary | ICD-10-CM

## 2023-08-24 DIAGNOSIS — R73.01: ICD-10-CM

## 2023-08-24 LAB
ALP SERPL-CCNC: 75 U/L (ref 46–116)
ALT SERPL W P-5'-P-CCNC: 25 U/L (ref 10–49)
BASOPHILS # BLD AUTO: 0.1 10*3/UL (ref 0–0.1)
BASOPHILS NFR BLD AUTO: 0.9 % (ref 0–1)
BUN SERPL-MCNC: 9 MG/DL (ref 9–23)
CHLORIDE SERPL-SCNC: 107 MMOL/L (ref 98–107)
CHOLEST SERPL-MCNC: 122 MG/DL (ref ?–200)
EOSINOPHIL # BLD AUTO: 0.3 10*3/UL (ref 0–0.4)
EOSINOPHIL # BLD AUTO: 5.6 % (ref 1–4)
ERYTHROCYTE [DISTWIDTH] IN BLOOD BY AUTOMATED COUNT: 12.3 % (ref 0–14.5)
HCT VFR BLD AUTO: 44.3 % (ref 42–52)
LDLC SERPL DIRECT ASSAY-MCNC: 56 MG/DL (ref 9–159)
LYMPHOCYTES # BLD AUTO: 1.7 10*3/UL (ref 1.3–4.4)
LYMPHOCYTES NFR BLD AUTO: 31.2 % (ref 27–41)
MCH RBC QN AUTO: 28.7 PG (ref 27–31)
MCHC RBC AUTO-ENTMCNC: 33.6 G/DL (ref 33–37)
MCV RBC AUTO: 85.4 FL (ref 80–94)
MONOCYTES # BLD AUTO: 0.4 10*3/UL (ref 0.1–1)
MONOCYTES NFR BLD MANUAL: 7.1 % (ref 3–9)
NEUT #: 3 10*3/UL (ref 2.3–7.9)
NEUT %: 55 % (ref 47–73)
NRBC BLD QL AUTO: 0 % (ref 0–0)
PLATELET # BLD AUTO: 218 10*3/UL (ref 130–400)
PMV BLD AUTO: 8.9 FL (ref 9.6–12.3)
POTASSIUM SERPL-SCNC: 4.6 MMOL/L (ref 3.4–5.1)
PROT SERPL-MCNC: 6.8 GM/DL (ref 6–8)
RBC # BLD AUTO: 5.19 10*6/UL (ref 4.5–5.9)
TRIGL SERPL-MCNC: 50 MG/DL (ref ?–150)
WBC NRBC COR # BLD AUTO: 5.4 10*3/UL (ref 4.8–10.8)

## 2023-11-27 ENCOUNTER — HOSPITAL ENCOUNTER (OUTPATIENT)
Dept: HOSPITAL 83 - SDC | Age: 63
Discharge: HOME | End: 2023-11-27
Attending: SURGERY
Payer: MEDICARE

## 2023-11-27 VITALS — DIASTOLIC BLOOD PRESSURE: 80 MMHG

## 2023-11-27 VITALS — HEIGHT: 66.97 IN | WEIGHT: 192 LBS | BODY MASS INDEX: 30.13 KG/M2

## 2023-11-27 VITALS — DIASTOLIC BLOOD PRESSURE: 77 MMHG

## 2023-11-27 VITALS — DIASTOLIC BLOOD PRESSURE: 75 MMHG

## 2023-11-27 VITALS — DIASTOLIC BLOOD PRESSURE: 78 MMHG

## 2023-11-27 DIAGNOSIS — Z87.891: ICD-10-CM

## 2023-11-27 DIAGNOSIS — Z79.899: ICD-10-CM

## 2023-11-27 DIAGNOSIS — Z98.890: ICD-10-CM

## 2023-11-27 DIAGNOSIS — E78.00: ICD-10-CM

## 2023-11-27 DIAGNOSIS — I25.10: ICD-10-CM

## 2023-11-27 DIAGNOSIS — G47.30: ICD-10-CM

## 2023-11-27 DIAGNOSIS — F32.A: ICD-10-CM

## 2023-11-27 DIAGNOSIS — K31.89: ICD-10-CM

## 2023-11-27 DIAGNOSIS — F41.9: ICD-10-CM

## 2023-11-27 DIAGNOSIS — I48.91: ICD-10-CM

## 2023-11-27 DIAGNOSIS — Z85.46: ICD-10-CM

## 2023-11-27 DIAGNOSIS — K21.00: Primary | ICD-10-CM

## 2024-02-22 ENCOUNTER — HOSPITAL ENCOUNTER (OUTPATIENT)
Dept: HOSPITAL 83 - LAB | Age: 64
Discharge: HOME | End: 2024-02-22
Attending: NURSE PRACTITIONER
Payer: MEDICARE

## 2024-02-22 DIAGNOSIS — F32.9: ICD-10-CM

## 2024-02-22 DIAGNOSIS — Z85.46: ICD-10-CM

## 2024-02-22 DIAGNOSIS — R00.1: ICD-10-CM

## 2024-02-22 DIAGNOSIS — E11.69: ICD-10-CM

## 2024-02-22 DIAGNOSIS — I25.10: Primary | ICD-10-CM

## 2024-02-22 LAB
ALP SERPL-CCNC: 71 U/L (ref 46–116)
ALT SERPL W P-5'-P-CCNC: 40 U/L (ref 5–49)
BASOPHILS # BLD AUTO: 0 10*3/UL (ref 0–0.1)
BASOPHILS NFR BLD AUTO: 0.8 % (ref 0–1)
BUN SERPL-MCNC: 10 MG/DL (ref 9–23)
CHLORIDE SERPL-SCNC: 106 MMOL/L (ref 98–107)
CHOLEST SERPL-MCNC: 129 MG/DL (ref ?–200)
CREAT UR-MCNC: 57.29 MG/DL
EOSINOPHIL # BLD AUTO: 0.4 10*3/UL (ref 0–0.4)
EOSINOPHIL # BLD AUTO: 8.2 % (ref 1–4)
ERYTHROCYTE [DISTWIDTH] IN BLOOD BY AUTOMATED COUNT: 12.4 % (ref 0–14.5)
HCT VFR BLD AUTO: 45.2 % (ref 42–52)
LDLC SERPL DIRECT ASSAY-MCNC: 60 MG/DL (ref 9–159)
LYMPHOCYTES # BLD AUTO: 1.8 10*3/UL (ref 1.3–4.4)
LYMPHOCYTES NFR BLD AUTO: 34.4 % (ref 27–41)
MCH RBC QN AUTO: 28.1 PG (ref 27–31)
MCHC RBC AUTO-ENTMCNC: 32.1 G/DL (ref 33–37)
MCV RBC AUTO: 87.6 FL (ref 80–94)
MONOCYTES # BLD AUTO: 0.5 10*3/UL (ref 0.1–1)
MONOCYTES NFR BLD MANUAL: 8.8 % (ref 3–9)
NEUT #: 2.5 10*3/UL (ref 2.3–7.9)
NEUT %: 47.8 % (ref 47–73)
NRBC BLD QL AUTO: 0 10*3/UL (ref 0–0)
PLATELET # BLD AUTO: 240 10*3/UL (ref 130–400)
PMV BLD AUTO: 8.9 FL (ref 9.6–12.3)
POTASSIUM SERPL-SCNC: 5 MMOL/L (ref 3.4–5.1)
PROT SERPL-MCNC: 6.8 GM/DL (ref 6–8)
RBC # BLD AUTO: 5.16 10*6/UL (ref 4.5–5.9)
TRIGL SERPL-MCNC: 64 MG/DL (ref ?–150)
WBC NRBC COR # BLD AUTO: 5.1 10*3/UL (ref 4.8–10.8)

## 2024-03-15 ENCOUNTER — HOSPITAL ENCOUNTER (OUTPATIENT)
Dept: HOSPITAL 83 - LAB | Age: 64
Discharge: HOME | End: 2024-03-15
Attending: NURSE PRACTITIONER
Payer: MEDICARE

## 2024-03-15 DIAGNOSIS — I10: ICD-10-CM

## 2024-03-15 DIAGNOSIS — Z86.010: ICD-10-CM

## 2024-03-15 DIAGNOSIS — K21.9: ICD-10-CM

## 2024-03-15 DIAGNOSIS — E11.8: ICD-10-CM

## 2024-03-15 DIAGNOSIS — R79.89: ICD-10-CM

## 2024-03-15 DIAGNOSIS — N13.2: Primary | ICD-10-CM

## 2024-03-15 LAB
ALP SERPL-CCNC: 77 U/L (ref 46–116)
ALT SERPL W P-5'-P-CCNC: 27 U/L (ref 5–49)
PROT SERPL-MCNC: 7 GM/DL (ref 6–8)

## 2024-06-14 ENCOUNTER — HOSPITAL ENCOUNTER (OUTPATIENT)
Dept: HOSPITAL 83 - LAB | Age: 64
Discharge: HOME | End: 2024-06-14
Attending: NURSE PRACTITIONER
Payer: MEDICARE

## 2024-06-14 DIAGNOSIS — Z86.010: ICD-10-CM

## 2024-06-14 DIAGNOSIS — E11.8: Primary | ICD-10-CM

## 2024-06-14 DIAGNOSIS — I10: ICD-10-CM

## 2024-06-14 DIAGNOSIS — N20.0: ICD-10-CM

## 2024-06-14 DIAGNOSIS — K21.9: ICD-10-CM

## 2024-06-14 DIAGNOSIS — R79.89: ICD-10-CM

## 2024-06-14 LAB
ALP SERPL-CCNC: 61 U/L (ref 46–116)
ALT SERPL W P-5'-P-CCNC: 24 U/L (ref 5–49)
BASOPHILS # BLD AUTO: 0 10*3/UL (ref 0–0.1)
BASOPHILS NFR BLD AUTO: 0.6 % (ref 0–1)
BUN SERPL-MCNC: 14 MG/DL (ref 9–23)
CHLORIDE SERPL-SCNC: 104 MMOL/L (ref 98–107)
CHOLEST SERPL-MCNC: 131 MG/DL (ref ?–200)
CREAT UR-MCNC: 118.29 MG/DL
EOSINOPHIL # BLD AUTO: 0.3 10*3/UL (ref 0–0.4)
EOSINOPHIL # BLD AUTO: 5.9 % (ref 1–4)
ERYTHROCYTE [DISTWIDTH] IN BLOOD BY AUTOMATED COUNT: 12.5 % (ref 0–14.5)
HCT VFR BLD AUTO: 41.9 % (ref 42–52)
LDLC SERPL DIRECT ASSAY-MCNC: 63 MG/DL (ref 9–159)
LYMPHOCYTES # BLD AUTO: 1.5 10*3/UL (ref 1.3–4.4)
LYMPHOCYTES NFR BLD AUTO: 31.7 % (ref 27–41)
MCH RBC QN AUTO: 28.4 PG (ref 27–31)
MCHC RBC AUTO-ENTMCNC: 32.5 G/DL (ref 33–37)
MCV RBC AUTO: 87.5 FL (ref 80–94)
MONOCYTES # BLD AUTO: 0.4 10*3/UL (ref 0.1–1)
MONOCYTES NFR BLD MANUAL: 9 % (ref 3–9)
NEUT #: 2.5 10*3/UL (ref 2.3–7.9)
NEUT %: 52.6 % (ref 47–73)
NRBC BLD QL AUTO: 0 10*3/UL (ref 0–0)
PLATELET # BLD AUTO: 216 10*3/UL (ref 130–400)
PMV BLD AUTO: 8.8 FL (ref 9.6–12.3)
POTASSIUM SERPL-SCNC: 4.6 MMOL/L (ref 3.4–5.1)
PROT SERPL-MCNC: 6.5 GM/DL (ref 6–8)
RBC # BLD AUTO: 4.79 10*6/UL (ref 4.5–5.9)
TRIGL SERPL-MCNC: 54 MG/DL (ref ?–150)
WBC NRBC COR # BLD AUTO: 4.8 10*3/UL (ref 4.8–10.8)

## 2024-07-23 ENCOUNTER — HOSPITAL ENCOUNTER (OUTPATIENT)
Dept: HOSPITAL 83 - LAB | Age: 64
Discharge: HOME | End: 2024-07-23
Attending: NURSE PRACTITIONER
Payer: MEDICARE

## 2024-07-23 DIAGNOSIS — Z20.822: Primary | ICD-10-CM

## 2024-09-12 ENCOUNTER — HOSPITAL ENCOUNTER (OUTPATIENT)
Dept: HOSPITAL 83 - LAB | Age: 64
Discharge: HOME | End: 2024-09-12
Attending: NURSE PRACTITIONER
Payer: MEDICARE

## 2024-09-12 DIAGNOSIS — Z79.899: ICD-10-CM

## 2024-09-12 DIAGNOSIS — F32.9: ICD-10-CM

## 2024-09-12 DIAGNOSIS — E78.00: ICD-10-CM

## 2024-09-12 DIAGNOSIS — F41.9: ICD-10-CM

## 2024-09-12 DIAGNOSIS — R00.1: Primary | ICD-10-CM

## 2024-09-12 LAB
ALP SERPL-CCNC: 63 U/L (ref 46–116)
ALT SERPL W P-5'-P-CCNC: 18 U/L (ref 5–49)
BASOPHILS # BLD AUTO: 0 10*3/UL (ref 0–0.1)
BASOPHILS NFR BLD AUTO: 0.8 % (ref 0–1)
BUN SERPL-MCNC: 14 MG/DL (ref 9–23)
CHLORIDE SERPL-SCNC: 106 MMOL/L (ref 98–107)
CHOLEST SERPL-MCNC: 146 MG/DL (ref ?–200)
CREAT UR-MCNC: 188.93 MG/DL
EOSINOPHIL # BLD AUTO: 0.3 10*3/UL (ref 0–0.4)
EOSINOPHIL # BLD AUTO: 5.1 % (ref 1–4)
ERYTHROCYTE [DISTWIDTH] IN BLOOD BY AUTOMATED COUNT: 12.5 % (ref 0–14.5)
HCT VFR BLD AUTO: 41.3 % (ref 42–52)
LDLC SERPL DIRECT ASSAY-MCNC: 73 MG/DL (ref 9–159)
LYMPHOCYTES # BLD AUTO: 1.9 10*3/UL (ref 1.3–4.4)
LYMPHOCYTES NFR BLD AUTO: 36.2 % (ref 27–41)
MCH RBC QN AUTO: 28.3 PG (ref 27–31)
MCHC RBC AUTO-ENTMCNC: 32.9 G/DL (ref 33–37)
MCV RBC AUTO: 85.9 FL (ref 80–94)
MONOCYTES # BLD AUTO: 0.5 10*3/UL (ref 0.1–1)
MONOCYTES NFR BLD MANUAL: 9.5 % (ref 3–9)
NEUT #: 2.5 10*3/UL (ref 2.3–7.9)
NEUT %: 48 % (ref 47–73)
NRBC BLD QL AUTO: 0 10*3/UL (ref 0–0)
PLATELET # BLD AUTO: 242 10*3/UL (ref 130–400)
PMV BLD AUTO: 9.2 FL (ref 9.6–12.3)
POTASSIUM SERPL-SCNC: 4.2 MMOL/L (ref 3.4–5.1)
PROT SERPL-MCNC: 6.6 GM/DL (ref 6–8)
RBC # BLD AUTO: 4.81 10*6/UL (ref 4.5–5.9)
TRIGL SERPL-MCNC: 57 MG/DL (ref ?–150)
WBC NRBC COR # BLD AUTO: 5.3 10*3/UL (ref 4.8–10.8)

## 2024-09-27 ENCOUNTER — HOSPITAL ENCOUNTER (EMERGENCY)
Dept: HOSPITAL 83 - ED | Age: 64
Discharge: HOME | End: 2024-09-27
Payer: MEDICARE

## 2024-09-27 VITALS — BODY MASS INDEX: 30.28 KG/M2 | HEIGHT: 66.97 IN | WEIGHT: 192.9 LBS
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
## 2024-09-27 DIAGNOSIS — Z98.890: ICD-10-CM
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
## 2024-09-27 DIAGNOSIS — I25.10: ICD-10-CM

## 2024-09-27 DIAGNOSIS — S43.101A: Primary | ICD-10-CM
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
## 2024-09-27 DIAGNOSIS — I50.9: ICD-10-CM

## 2024-09-27 DIAGNOSIS — I48.91: ICD-10-CM

## 2024-09-27 DIAGNOSIS — I25.2: ICD-10-CM
